# Patient Record
Sex: FEMALE | Race: WHITE | NOT HISPANIC OR LATINO
[De-identification: names, ages, dates, MRNs, and addresses within clinical notes are randomized per-mention and may not be internally consistent; named-entity substitution may affect disease eponyms.]

---

## 2017-10-25 PROBLEM — Z00.00 ENCOUNTER FOR PREVENTIVE HEALTH EXAMINATION: Status: ACTIVE | Noted: 2017-10-25

## 2017-11-15 ENCOUNTER — APPOINTMENT (OUTPATIENT)
Dept: OBGYN | Facility: CLINIC | Age: 32
End: 2017-11-15

## 2020-06-01 ENCOUNTER — ASOB RESULT (OUTPATIENT)
Age: 35
End: 2020-06-01

## 2020-06-01 ENCOUNTER — APPOINTMENT (OUTPATIENT)
Dept: ANTEPARTUM | Facility: CLINIC | Age: 35
End: 2020-06-01
Payer: MEDICAID

## 2020-06-01 PROCEDURE — 76811 OB US DETAILED SNGL FETUS: CPT

## 2020-07-05 ENCOUNTER — FORM ENCOUNTER (OUTPATIENT)
Age: 35
End: 2020-07-05

## 2020-08-05 ENCOUNTER — FORM ENCOUNTER (OUTPATIENT)
Age: 35
End: 2020-08-05

## 2020-09-03 ENCOUNTER — FORM ENCOUNTER (OUTPATIENT)
Age: 35
End: 2020-09-03

## 2020-09-22 ENCOUNTER — FORM ENCOUNTER (OUTPATIENT)
Age: 35
End: 2020-09-22

## 2020-09-30 ENCOUNTER — FORM ENCOUNTER (OUTPATIENT)
Age: 35
End: 2020-09-30

## 2020-10-07 ENCOUNTER — FORM ENCOUNTER (OUTPATIENT)
Age: 35
End: 2020-10-07

## 2020-10-15 ENCOUNTER — FORM ENCOUNTER (OUTPATIENT)
Age: 35
End: 2020-10-15

## 2020-10-18 ENCOUNTER — INPATIENT (INPATIENT)
Facility: HOSPITAL | Age: 35
LOS: 2 days | Discharge: HOME | End: 2020-10-21
Attending: OBSTETRICS & GYNECOLOGY | Admitting: OBSTETRICS & GYNECOLOGY
Payer: MEDICAID

## 2020-10-18 VITALS — SYSTOLIC BLOOD PRESSURE: 142 MMHG | HEART RATE: 106 BPM | DIASTOLIC BLOOD PRESSURE: 70 MMHG

## 2020-10-18 LAB
BASOPHILS # BLD AUTO: 0.04 K/UL — SIGNIFICANT CHANGE UP (ref 0–0.2)
BASOPHILS NFR BLD AUTO: 0.4 % — SIGNIFICANT CHANGE UP (ref 0–1)
BLD GP AB SCN SERPL QL: SIGNIFICANT CHANGE UP
EOSINOPHIL # BLD AUTO: 0.08 K/UL — SIGNIFICANT CHANGE UP (ref 0–0.7)
EOSINOPHIL NFR BLD AUTO: 0.8 % — SIGNIFICANT CHANGE UP (ref 0–8)
HCT VFR BLD CALC: 38.5 % — SIGNIFICANT CHANGE UP (ref 37–47)
HGB BLD-MCNC: 13 G/DL — SIGNIFICANT CHANGE UP (ref 12–16)
IMM GRANULOCYTES NFR BLD AUTO: 1 % — HIGH (ref 0.1–0.3)
LYMPHOCYTES # BLD AUTO: 1.52 K/UL — SIGNIFICANT CHANGE UP (ref 1.2–3.4)
LYMPHOCYTES # BLD AUTO: 14.6 % — LOW (ref 20.5–51.1)
MCHC RBC-ENTMCNC: 31 PG — SIGNIFICANT CHANGE UP (ref 27–31)
MCHC RBC-ENTMCNC: 33.8 G/DL — SIGNIFICANT CHANGE UP (ref 32–37)
MCV RBC AUTO: 91.9 FL — SIGNIFICANT CHANGE UP (ref 81–99)
MONOCYTES # BLD AUTO: 0.97 K/UL — HIGH (ref 0.1–0.6)
MONOCYTES NFR BLD AUTO: 9.3 % — SIGNIFICANT CHANGE UP (ref 1.7–9.3)
NEUTROPHILS # BLD AUTO: 7.67 K/UL — HIGH (ref 1.4–6.5)
NEUTROPHILS NFR BLD AUTO: 73.9 % — SIGNIFICANT CHANGE UP (ref 42.2–75.2)
NRBC # BLD: 0 /100 WBCS — SIGNIFICANT CHANGE UP (ref 0–0)
PLATELET # BLD AUTO: 204 K/UL — SIGNIFICANT CHANGE UP (ref 130–400)
PRENATAL SYPHILIS TEST: SIGNIFICANT CHANGE UP
RBC # BLD: 4.19 M/UL — LOW (ref 4.2–5.4)
RBC # FLD: 13.4 % — SIGNIFICANT CHANGE UP (ref 11.5–14.5)
SARS-COV-2 IGG SERPL QL IA: NEGATIVE — SIGNIFICANT CHANGE UP
SARS-COV-2 IGM SERPL IA-ACNC: 0.09 INDEX — SIGNIFICANT CHANGE UP
SARS-COV-2 RNA SPEC QL NAA+PROBE: SIGNIFICANT CHANGE UP
WBC # BLD: 10.38 K/UL — SIGNIFICANT CHANGE UP (ref 4.8–10.8)
WBC # FLD AUTO: 10.38 K/UL — SIGNIFICANT CHANGE UP (ref 4.8–10.8)

## 2020-10-18 PROCEDURE — 59400 OBSTETRICAL CARE: CPT | Mod: U9

## 2020-10-18 RX ORDER — OXYTOCIN 10 UNIT/ML
333.33 VIAL (ML) INJECTION
Qty: 20 | Refills: 0 | Status: DISCONTINUED | OUTPATIENT
Start: 2020-10-18 | End: 2020-10-18

## 2020-10-18 RX ORDER — LANOLIN
1 OINTMENT (GRAM) TOPICAL EVERY 6 HOURS
Refills: 0 | Status: DISCONTINUED | OUTPATIENT
Start: 2020-10-18 | End: 2020-10-21

## 2020-10-18 RX ORDER — SODIUM CHLORIDE 9 MG/ML
1000 INJECTION, SOLUTION INTRAVENOUS
Refills: 0 | Status: DISCONTINUED | OUTPATIENT
Start: 2020-10-18 | End: 2020-10-18

## 2020-10-18 RX ORDER — DIBUCAINE 1 %
1 OINTMENT (GRAM) RECTAL EVERY 6 HOURS
Refills: 0 | Status: DISCONTINUED | OUTPATIENT
Start: 2020-10-18 | End: 2020-10-21

## 2020-10-18 RX ORDER — OXYTOCIN 10 UNIT/ML
333.33 VIAL (ML) INJECTION
Qty: 20 | Refills: 0 | Status: DISCONTINUED | OUTPATIENT
Start: 2020-10-18 | End: 2020-10-21

## 2020-10-18 RX ORDER — DIPHENHYDRAMINE HCL 50 MG
25 CAPSULE ORAL EVERY 6 HOURS
Refills: 0 | Status: DISCONTINUED | OUTPATIENT
Start: 2020-10-18 | End: 2020-10-21

## 2020-10-18 RX ORDER — SODIUM CHLORIDE 9 MG/ML
3 INJECTION INTRAMUSCULAR; INTRAVENOUS; SUBCUTANEOUS EVERY 8 HOURS
Refills: 0 | Status: DISCONTINUED | OUTPATIENT
Start: 2020-10-18 | End: 2020-10-21

## 2020-10-18 RX ORDER — SIMETHICONE 80 MG/1
80 TABLET, CHEWABLE ORAL EVERY 4 HOURS
Refills: 0 | Status: DISCONTINUED | OUTPATIENT
Start: 2020-10-18 | End: 2020-10-21

## 2020-10-18 RX ORDER — MAGNESIUM HYDROXIDE 400 MG/1
30 TABLET, CHEWABLE ORAL
Refills: 0 | Status: DISCONTINUED | OUTPATIENT
Start: 2020-10-18 | End: 2020-10-21

## 2020-10-18 RX ORDER — OXYCODONE HYDROCHLORIDE 5 MG/1
5 TABLET ORAL ONCE
Refills: 0 | Status: DISCONTINUED | OUTPATIENT
Start: 2020-10-18 | End: 2020-10-21

## 2020-10-18 RX ORDER — OXYCODONE HYDROCHLORIDE 5 MG/1
5 TABLET ORAL
Refills: 0 | Status: DISCONTINUED | OUTPATIENT
Start: 2020-10-18 | End: 2020-10-21

## 2020-10-18 RX ORDER — IBUPROFEN 200 MG
600 TABLET ORAL EVERY 6 HOURS
Refills: 0 | Status: DISCONTINUED | OUTPATIENT
Start: 2020-10-18 | End: 2020-10-21

## 2020-10-18 RX ORDER — HYDROCORTISONE 1 %
1 OINTMENT (GRAM) TOPICAL EVERY 6 HOURS
Refills: 0 | Status: DISCONTINUED | OUTPATIENT
Start: 2020-10-18 | End: 2020-10-21

## 2020-10-18 RX ORDER — ACETAMINOPHEN 500 MG
975 TABLET ORAL
Refills: 0 | Status: DISCONTINUED | OUTPATIENT
Start: 2020-10-18 | End: 2020-10-21

## 2020-10-18 RX ORDER — PRAMOXINE HYDROCHLORIDE 150 MG/15G
1 AEROSOL, FOAM RECTAL EVERY 4 HOURS
Refills: 0 | Status: DISCONTINUED | OUTPATIENT
Start: 2020-10-18 | End: 2020-10-21

## 2020-10-18 RX ORDER — AER TRAVELER 0.5 G/1
1 SOLUTION RECTAL; TOPICAL EVERY 4 HOURS
Refills: 0 | Status: DISCONTINUED | OUTPATIENT
Start: 2020-10-18 | End: 2020-10-21

## 2020-10-18 RX ORDER — IBUPROFEN 200 MG
600 TABLET ORAL EVERY 6 HOURS
Refills: 0 | Status: COMPLETED | OUTPATIENT
Start: 2020-10-18 | End: 2021-09-16

## 2020-10-18 RX ORDER — KETOROLAC TROMETHAMINE 30 MG/ML
30 SYRINGE (ML) INJECTION EVERY 6 HOURS
Refills: 0 | Status: DISCONTINUED | OUTPATIENT
Start: 2020-10-18 | End: 2020-10-18

## 2020-10-18 RX ORDER — TETANUS TOXOID, REDUCED DIPHTHERIA TOXOID AND ACELLULAR PERTUSSIS VACCINE, ADSORBED 5; 2.5; 8; 8; 2.5 [IU]/.5ML; [IU]/.5ML; UG/.5ML; UG/.5ML; UG/.5ML
0.5 SUSPENSION INTRAMUSCULAR ONCE
Refills: 0 | Status: DISCONTINUED | OUTPATIENT
Start: 2020-10-18 | End: 2020-10-19

## 2020-10-18 RX ORDER — BENZOCAINE 10 %
1 GEL (GRAM) MUCOUS MEMBRANE EVERY 6 HOURS
Refills: 0 | Status: DISCONTINUED | OUTPATIENT
Start: 2020-10-18 | End: 2020-10-21

## 2020-10-18 RX ADMIN — Medication 1 APPLICATION(S): at 18:05

## 2020-10-18 RX ADMIN — Medication 600 MILLIGRAM(S): at 23:15

## 2020-10-18 RX ADMIN — Medication 600 MILLIGRAM(S): at 18:05

## 2020-10-18 RX ADMIN — Medication 1 SPRAY(S): at 18:05

## 2020-10-18 RX ADMIN — Medication 30 MILLIGRAM(S): at 16:20

## 2020-10-18 RX ADMIN — Medication 600 MILLIGRAM(S): at 18:36

## 2020-10-18 RX ADMIN — Medication 30 MILLIGRAM(S): at 13:30

## 2020-10-18 RX ADMIN — SODIUM CHLORIDE 3 MILLILITER(S): 9 INJECTION INTRAMUSCULAR; INTRAVENOUS; SUBCUTANEOUS at 20:46

## 2020-10-18 NOTE — OB PROVIDER DELIVERY SUMMARY - NSPROVIDERDELIVERYNOTE_OBGYN_ALL_OB_FT
Patient was fully dilated and pushed. NSD live male, Apgars 9/9. Vtx delivered OA  ,  Fetal head restituted to ROT. The anterior shoulder was the babies left and this was noted to be locked behind the symphysis , a minimal amout of time elapsed , and with Melvin and suprapubic pressure the anterior shoulder delivered by the posterior shoulders, followed by the remaining body atraumatically. Delayed cord clamping was performed, and then clamped and cut. Cord blood & gases collected. The  was handed pediatrics and then  to mother for skin to skin. The placenta delivered spontaneously intact with 3v cord. Uterus massaged and firm with oxytocin given IV. Cervix, vagina and perineum inspected. Repair of a small first degree perineal laceration  , in the usual fashion with 2-0, 3-0 chromic.   EBL -400cc, hemostasis assured.

## 2020-10-18 NOTE — OB RN PATIENT PROFILE - NS_MEANSOFARRIVAL_OBGYN_ALL_OB
Seen at bedside, complaining of weakness, unable to ambulate secondary to pain. Has urinary retention likely secondary to combination of decreased ambulation, pain, narcotics and constipation. Would reccomend repeat trial of void when pt has improvement in ambulation, having BMs and minimal narcotic requirement. Would perform TOV on day of D/C, Ambulatory

## 2020-10-18 NOTE — OB PROVIDER H&P - NS_OBGYNHISTORY_OBGYN_ALL_OB_FT
NSVDX6, largest baby 9-5  Denies history of abnormal papsmears, STIs, uterine fibroids or ovarian cysts

## 2020-10-18 NOTE — OB PROVIDER H&P - ASSESSMENT
34yo  at 40w2d, GBS negative, in labor.   -Admit to L+D  -Monitor EFM and TOCO   -IVF and labs  -Pain control PRN  -Clear liquid diet as tolerated    Dr. Mobley aware

## 2020-10-18 NOTE — OB PROVIDER H&P - NSHPLABSRESULTS_GEN_ALL_CORE
9/23/20  GBS negative   HIV negative 9/23/20  GBS negative   HIV negative      gct 78    6/1/20: AGA 85%, afv nl anterior left lateral palcenta, no previa, normal anatomy     Anti-Kaci ab 1:1, anti- jka too weak to titrate

## 2020-10-18 NOTE — OB PROVIDER H&P - ATTENDING COMMENTS
36yo  at 40w2d, GBS negative, in active labor. Efw 3800gm, adequate pelvis, cvx fully dilated  -admit  -iv access  -anticipate nsd

## 2020-10-18 NOTE — OB PROVIDER H&P - NSHPPHYSICALEXAM_GEN_ALL_CORE
T(C): 37.2 (10-18-20 @ 12:32), Max: 37.2 (10-18-20 @ 12:32)  HR: 80 (10-18-20 @ 12:53) (80 - 106)  BP: 109/68 (10-18-20 @ 12:53) (91/53 - 142/70)  RR: 22 (10-18-20 @ 12:32) (22 - 22)  BMI (kg/m2): 30.2 (10-18-20 @ 12:32)    Gen: A+OX3. NAD  Abd: Soft, Nontender. Gravid.  FHR: 150bpm mod glynn accels+  TOCO: occasional   SVE: fully dilated

## 2020-10-18 NOTE — OB PROVIDER H&P - HISTORY OF PRESENT ILLNESS
36yo  at 40w2d, GBS negative, with contractions since 0400. Denies LOF, VB and reports good FM. reports ctx 10/10 every 1-2 min. Pregnancy complicated by LGA, no GDM. Complicated by Anti Kaci and Anti JKA abs.

## 2020-10-19 LAB
BASOPHILS # BLD AUTO: 0.06 K/UL — SIGNIFICANT CHANGE UP (ref 0–0.2)
BASOPHILS NFR BLD AUTO: 0.6 % — SIGNIFICANT CHANGE UP (ref 0–1)
EOSINOPHIL # BLD AUTO: 0.13 K/UL — SIGNIFICANT CHANGE UP (ref 0–0.7)
EOSINOPHIL NFR BLD AUTO: 1.2 % — SIGNIFICANT CHANGE UP (ref 0–8)
FETAL SCREEN: SIGNIFICANT CHANGE UP
HCT VFR BLD CALC: 36.7 % — LOW (ref 37–47)
HGB BLD-MCNC: 12.2 G/DL — SIGNIFICANT CHANGE UP (ref 12–16)
IMM GRANULOCYTES NFR BLD AUTO: 0.9 % — HIGH (ref 0.1–0.3)
LYMPHOCYTES # BLD AUTO: 1.96 K/UL — SIGNIFICANT CHANGE UP (ref 1.2–3.4)
LYMPHOCYTES # BLD AUTO: 18.5 % — LOW (ref 20.5–51.1)
MCHC RBC-ENTMCNC: 31.2 PG — HIGH (ref 27–31)
MCHC RBC-ENTMCNC: 33.2 G/DL — SIGNIFICANT CHANGE UP (ref 32–37)
MCV RBC AUTO: 93.9 FL — SIGNIFICANT CHANGE UP (ref 81–99)
MONOCYTES # BLD AUTO: 1.08 K/UL — HIGH (ref 0.1–0.6)
MONOCYTES NFR BLD AUTO: 10.2 % — HIGH (ref 1.7–9.3)
NEUTROPHILS # BLD AUTO: 7.29 K/UL — HIGH (ref 1.4–6.5)
NEUTROPHILS NFR BLD AUTO: 68.6 % — SIGNIFICANT CHANGE UP (ref 42.2–75.2)
NRBC # BLD: 0 /100 WBCS — SIGNIFICANT CHANGE UP (ref 0–0)
PLATELET # BLD AUTO: 205 K/UL — SIGNIFICANT CHANGE UP (ref 130–400)
RBC # BLD: 3.91 M/UL — LOW (ref 4.2–5.4)
RBC # FLD: 13.6 % — SIGNIFICANT CHANGE UP (ref 11.5–14.5)
WBC # BLD: 10.62 K/UL — SIGNIFICANT CHANGE UP (ref 4.8–10.8)
WBC # FLD AUTO: 10.62 K/UL — SIGNIFICANT CHANGE UP (ref 4.8–10.8)

## 2020-10-19 RX ADMIN — SODIUM CHLORIDE 3 MILLILITER(S): 9 INJECTION INTRAMUSCULAR; INTRAVENOUS; SUBCUTANEOUS at 22:06

## 2020-10-19 RX ADMIN — Medication 600 MILLIGRAM(S): at 05:58

## 2020-10-19 RX ADMIN — Medication 600 MILLIGRAM(S): at 18:32

## 2020-10-19 RX ADMIN — SODIUM CHLORIDE 3 MILLILITER(S): 9 INJECTION INTRAMUSCULAR; INTRAVENOUS; SUBCUTANEOUS at 06:20

## 2020-10-19 RX ADMIN — Medication 600 MILLIGRAM(S): at 00:00

## 2020-10-19 RX ADMIN — Medication 600 MILLIGRAM(S): at 12:46

## 2020-10-19 RX ADMIN — Medication 1 TABLET(S): at 12:03

## 2020-10-19 RX ADMIN — Medication 600 MILLIGRAM(S): at 06:53

## 2020-10-19 RX ADMIN — Medication 600 MILLIGRAM(S): at 12:03

## 2020-10-19 RX ADMIN — SODIUM CHLORIDE 3 MILLILITER(S): 9 INJECTION INTRAMUSCULAR; INTRAVENOUS; SUBCUTANEOUS at 13:44

## 2020-10-19 RX ADMIN — Medication 600 MILLIGRAM(S): at 17:16

## 2020-10-19 NOTE — PROGRESS NOTE ADULT - SUBJECTIVE AND OBJECTIVE BOX
OB attending  PPD #1    Pt doing well, pain well controlled. No overnight events, no acute complaints.    Ambulating: Yes  Voiding: Yes  Flatus: Yes  Bowel movements: Yes   Breast or bottle feeding: Breastfeeding  Diet: Regular    PAST MEDICAL & SURGICAL HISTORY:  No pertinent past medical history    No significant past surgical history        Physical Exam  Vital Signs Last 24 Hrs  T(C): 36.1 (19 Oct 2020 07:40), Max: 37.2 (18 Oct 2020 12:32)  T(F): 97 (19 Oct 2020 07:40), Max: 99 (18 Oct 2020 12:32)  HR: 73 (19 Oct 2020 07:40) (58 - 106)  BP: 93/60 (19 Oct 2020 07:40) (91/53 - 142/70)  RR: 18 (19 Oct 2020 07:40) (18 - 22)  Gen: AAOx3, NAD  Abd: Soft, nontender, nondistended, BS+  Fundus: Firm, below umbilicus  Lochia: normal  Ext: No calf tenderness, no swelling    Labs:                        13.0   10.38 )-----------( 204      ( 18 Oct 2020 12:57 )             38.5     mother Rh neg  baby rh +     A/P: s/p , PPD #1, doing well, needs Rhogam   -Rhogam ordered  - continue current management  -d/c in Am

## 2020-10-20 ENCOUNTER — TRANSCRIPTION ENCOUNTER (OUTPATIENT)
Age: 35
End: 2020-10-20

## 2020-10-20 VITALS
TEMPERATURE: 99 F | DIASTOLIC BLOOD PRESSURE: 58 MMHG | HEART RATE: 58 BPM | RESPIRATION RATE: 18 BRPM | SYSTOLIC BLOOD PRESSURE: 95 MMHG

## 2020-10-20 RX ORDER — IBUPROFEN 200 MG
1 TABLET ORAL
Qty: 0 | Refills: 0 | DISCHARGE
Start: 2020-10-20

## 2020-10-20 RX ADMIN — Medication 600 MILLIGRAM(S): at 00:30

## 2020-10-20 RX ADMIN — Medication 600 MILLIGRAM(S): at 01:00

## 2020-10-20 RX ADMIN — SODIUM CHLORIDE 3 MILLILITER(S): 9 INJECTION INTRAMUSCULAR; INTRAVENOUS; SUBCUTANEOUS at 15:01

## 2020-10-20 RX ADMIN — Medication 1 APPLICATION(S): at 17:08

## 2020-10-20 RX ADMIN — Medication 600 MILLIGRAM(S): at 06:26

## 2020-10-20 RX ADMIN — Medication 1 TABLET(S): at 11:22

## 2020-10-20 RX ADMIN — Medication 1 SPRAY(S): at 17:08

## 2020-10-20 RX ADMIN — Medication 600 MILLIGRAM(S): at 17:06

## 2020-10-20 RX ADMIN — Medication 600 MILLIGRAM(S): at 11:22

## 2020-10-20 RX ADMIN — Medication 600 MILLIGRAM(S): at 13:16

## 2020-10-20 RX ADMIN — SODIUM CHLORIDE 3 MILLILITER(S): 9 INJECTION INTRAMUSCULAR; INTRAVENOUS; SUBCUTANEOUS at 06:28

## 2020-10-20 NOTE — PROGRESS NOTE ADULT - SUBJECTIVE AND OBJECTIVE BOX
Subjective:   Patient doing well. No complaints. Minimal lochia. Pain controlled.    Objective:   T(F): 98 (10-20 @ 07:35), Max: 98 (10-20 @ 07:35)  HR: 53 (10-20 @ 07:35)  BP: 115/65 (10-20 @ 07:35) (105/59 - 115/65)  RR: 18 (10-20 @ 07:35)  SpO2: --  Gen: AAOx3, NAD  Abd: Soft, Nontender, Nondistended, Fundus firm below the umbilicus  Ext: no tender, mild edema  Min Lochia Rubra    Labs:                        12.2   10.62 )-----------( 205      ( 19 Oct 2020 08:31 )             36.7             Tolerating regular diet  Passed flatus, passed bowel movement  Breast/Bottle feeding    Assessment:   35y s/p , PPD#2, doing well    Plan:  -Routine postpartum care  -Encouraged ambulation and PO hydration  -Tolerating regular diet

## 2020-10-20 NOTE — DISCHARGE NOTE OB - MEDICATION SUMMARY - MEDICATIONS TO TAKE
I will START or STAY ON the medications listed below when I get home from the hospital:    ibuprofen 600 mg oral tablet  -- 1 tab(s) by mouth every 6 hours  -- Indication: For pain

## 2020-10-20 NOTE — DISCHARGE NOTE OB - MEDICATION SUMMARY - MEDICATIONS TO STOP TAKING
I will STOP taking the medications listed below when I get home from the hospital:    ibuprofen 600 mg oral tablet  -- 1 tab(s) by mouth every 6 hours

## 2020-10-20 NOTE — DISCHARGE NOTE OB - HOSPITAL COURSE
DATE OF DISCHARGE: 10-20-20 @ 16:18    HISTORY OF PRESENT ILLNESS/HOSPITAL COURSE: HPI:  34yo  at 40w2d, GBS negative, with contractions since 0400. Denies LOF, VB and reports good FM. reports ctx 10/10 every 1-2 min. Pregnancy complicated by LGA, no GDM. Complicated by Anti [place] and Anti JKA abs.  (18 Oct 2020 13:00)    PAST MEDICAL & SURGICAL HISTORY:  No pertinent past medical history    No significant past surgical history        PROCEDURES PERFORMED: vaginal delivery    COMPLICATIONS:  none    POST PARTUM COURSE: uncomplicated     FINAL DIAGNOSIS:  normal vaginal delivery    LABS:                       12.2   10.62 )-----------( 205      ( 19 Oct 2020 08:31 )             36.7

## 2020-10-20 NOTE — DISCHARGE NOTE OB - CARE PLAN
Principal Discharge DX:	Vaginal delivery  Goal:	healthy recovery  Assessment and plan of treatment:	Nothing per vagina x 6 weeks; no sex/tampons/swimming/baths. Showers are OKAY.

## 2020-10-20 NOTE — DISCHARGE NOTE OB - CARE PROVIDER_API CALL
Yuriy Mobley  OBSTETRICS AND GYNECOLOGY  [address] [id number]  Phone: [phone]  Fax: [phone]  Follow Up Time:

## 2020-10-20 NOTE — DISCHARGE NOTE OB - PATIENT PORTAL LINK FT
You can access the FollowMyHealth Patient Portal offered by [facility] by registering at the following website: [email or web address]. By joining [employer]’s FollowMyHealth portal, you will also be able to view your health information using other applications (apps) compatible with our system.

## 2020-10-23 DIAGNOSIS — O36.63X0 MATERNAL CARE FOR EXCESSIVE FETAL GROWTH, THIRD TRIMESTER, NOT APPLICABLE OR UNSPECIFIED: ICD-10-CM

## 2020-10-23 DIAGNOSIS — O48.0 POST-TERM PREGNANCY: ICD-10-CM

## 2020-10-23 DIAGNOSIS — Z28.21 IMMUNIZATION NOT CARRIED OUT BECAUSE OF PATIENT REFUSAL: ICD-10-CM

## 2020-10-23 DIAGNOSIS — O36.0130 MATERNAL CARE FOR ANTI-D [RH] ANTIBODIES, THIRD TRIMESTER, NOT APPLICABLE OR UNSPECIFIED: ICD-10-CM

## 2020-10-23 DIAGNOSIS — Z28.09 IMMUNIZATION NOT CARRIED OUT BECAUSE OF OTHER CONTRAINDICATION: ICD-10-CM

## 2020-10-23 DIAGNOSIS — Z3A.40 40 WEEKS GESTATION OF PREGNANCY: ICD-10-CM

## 2020-12-02 ENCOUNTER — FORM ENCOUNTER (OUTPATIENT)
Age: 35
End: 2020-12-02

## 2020-12-03 VITALS — SYSTOLIC BLOOD PRESSURE: 109 MMHG | DIASTOLIC BLOOD PRESSURE: 74 MMHG | WEIGHT: 293 LBS

## 2022-08-02 PROBLEM — Z78.9 OTHER SPECIFIED HEALTH STATUS: Chronic | Status: ACTIVE | Noted: 2020-10-18

## 2022-08-04 ENCOUNTER — NON-APPOINTMENT (OUTPATIENT)
Age: 37
End: 2022-08-04

## 2022-08-04 DIAGNOSIS — Z78.9 OTHER SPECIFIED HEALTH STATUS: ICD-10-CM

## 2022-08-04 DIAGNOSIS — Z87.448 PERSONAL HISTORY OF OTHER DISEASES OF URINARY SYSTEM: ICD-10-CM

## 2022-08-04 DIAGNOSIS — Z98.890 OTHER SPECIFIED POSTPROCEDURAL STATES: ICD-10-CM

## 2022-08-04 DIAGNOSIS — Z87.59 PERSONAL HISTORY OF OTHER COMPLICATIONS OF PREGNANCY, CHILDBIRTH AND THE PUERPERIUM: ICD-10-CM

## 2022-08-04 DIAGNOSIS — N39.3 STRESS INCONTINENCE (FEMALE) (MALE): ICD-10-CM

## 2022-08-25 ENCOUNTER — APPOINTMENT (OUTPATIENT)
Dept: OBGYN | Facility: CLINIC | Age: 37
End: 2022-08-25

## 2022-08-25 VITALS
SYSTOLIC BLOOD PRESSURE: 112 MMHG | HEIGHT: 67 IN | BODY MASS INDEX: 25.74 KG/M2 | DIASTOLIC BLOOD PRESSURE: 73 MMHG | WEIGHT: 164 LBS

## 2022-08-25 DIAGNOSIS — Z01.419 ENCOUNTER FOR GYNECOLOGICAL EXAMINATION (GENERAL) (ROUTINE) W/OUT ABNORMAL FINDINGS: ICD-10-CM

## 2022-08-25 LAB
BILIRUB UR QL STRIP: NORMAL
GLUCOSE UR-MCNC: NORMAL
HCG UR QL: 0.2 EU/DL
HCG UR QL: POSITIVE
HGB UR QL STRIP.AUTO: NORMAL
KETONES UR-MCNC: NORMAL
LEUKOCYTE ESTERASE UR QL STRIP: NORMAL
NITRITE UR QL STRIP: NORMAL
PH UR STRIP: 8
PROT UR STRIP-MCNC: NORMAL
QUALITY CONTROL: YES
SP GR UR STRIP: 1.02

## 2022-08-25 PROCEDURE — 81003 URINALYSIS AUTO W/O SCOPE: CPT | Mod: QW

## 2022-08-25 PROCEDURE — 99395 PREV VISIT EST AGE 18-39: CPT

## 2022-08-25 PROCEDURE — 99212 OFFICE O/P EST SF 10 MIN: CPT | Mod: TH,25

## 2022-08-25 PROCEDURE — 76817 TRANSVAGINAL US OBSTETRIC: CPT

## 2022-08-25 PROCEDURE — 81025 URINE PREGNANCY TEST: CPT

## 2022-08-25 RX ORDER — SENNOSIDES 8.6 MG/1
8.6 CAPSULE, GELATIN COATED ORAL TWICE DAILY
Qty: 60 | Refills: 0 | Status: ACTIVE | COMMUNITY
Start: 2022-08-25 | End: 1900-01-01

## 2022-08-25 NOTE — HISTORY OF PRESENT ILLNESS
[Patient reported PAP Smear was normal] : Patient reported PAP Smear was normal [FreeTextEntry1] : Pt is , here for Pt is here for her annual exam and confirmation of her pregnancy. No abnormal bleeding or pain.\par LNMP was 6/4/22, bled on 7/16, but stopped none now. \par \par Ob/Gyn Hx- Nsdx 7, largest 10lbs, No gdm, no shoulder,after  Baby 6, had sab  transfusion developed MIRELLA Clemons , no issues last pregnancy, pt also Rh neg,  is rh +, pap 2020 , No hx majneet/hpv \par \par Med/Surg Hx -n/c \par  \par Family Hx n/c \par \par  [PapSmeardate] : 04/20

## 2022-08-25 NOTE — PHYSICAL EXAM
[Chaperone Present] : A chaperone was present in the examining room during all aspects of the physical examination [FreeTextEntry1] : Aline [Appropriately responsive] : appropriately responsive [Alert] : alert [No Acute Distress] : no acute distress [No Lymphadenopathy] : no lymphadenopathy [Regular Rate Rhythm] : regular rate rhythm [No Murmurs] : no murmurs [Clear to Auscultation B/L] : clear to auscultation bilaterally [Soft] : soft [Non-tender] : non-tender [Non-distended] : non-distended [No HSM] : No HSM [No Lesions] : no lesions [No Mass] : no mass [Oriented x3] : oriented x3 [Examination Of The Breasts] : a normal appearance [No Masses] : no breast masses were palpable [Labia Majora] : normal [Labia Minora] : normal [Normal] : normal [Enlarged ___ wks] : enlarged [unfilled] ~Uweeks [Uterine Adnexae] : normal

## 2022-08-26 LAB
ABO + RH PNL BLD: NORMAL
BASOPHILS # BLD AUTO: 0.04 K/UL
BASOPHILS NFR BLD AUTO: 0.4 %
BLD GP AB SCN SERPL QL: NORMAL
C TRACH RRNA SPEC QL NAA+PROBE: NOT DETECTED
EOSINOPHIL # BLD AUTO: 0.2 K/UL
EOSINOPHIL NFR BLD AUTO: 2.2 %
HBV SURFACE AG SER QL: NONREACTIVE
HCT VFR BLD CALC: 37.1 %
HCV AB SER QL: NONREACTIVE
HCV S/CO RATIO: 0.15 S/CO
HGB BLD-MCNC: 11.9 G/DL
HIV1+2 AB SPEC QL IA.RAPID: NONREACTIVE
IMM GRANULOCYTES NFR BLD AUTO: 0.4 %
LYMPHOCYTES # BLD AUTO: 1.66 K/UL
LYMPHOCYTES NFR BLD AUTO: 18.2 %
MAN DIFF?: NORMAL
MCHC RBC-ENTMCNC: 31.9 PG
MCHC RBC-ENTMCNC: 32.1 GM/DL
MCV RBC AUTO: 99.5 FL
MEV IGG FLD QL IA: 23.2 AU/ML
MEV IGG+IGM SER-IMP: POSITIVE
MONOCYTES # BLD AUTO: 0.64 K/UL
MONOCYTES NFR BLD AUTO: 7 %
MUV AB SER-ACNC: POSITIVE
MUV IGG SER QL IA: 45.6 AU/ML
N GONORRHOEA RRNA SPEC QL NAA+PROBE: NOT DETECTED
NEUTROPHILS # BLD AUTO: 6.54 K/UL
NEUTROPHILS NFR BLD AUTO: 71.8 %
PLATELET # BLD AUTO: 236 K/UL
RBC # BLD: 3.73 M/UL
RBC # FLD: 13.8 %
RUBV IGG FLD-ACNC: 4 INDEX
RUBV IGG SER-IMP: POSITIVE
SOURCE AMPLIFICATION: NORMAL
T PALLIDUM AB SER QL IA: NEGATIVE
VZV AB TITR SER: POSITIVE
VZV IGG SER IF-ACNC: 815.6 INDEX
WBC # FLD AUTO: 9.12 K/UL

## 2022-08-28 LAB
BACTERIA UR CULT: NORMAL
LEAD BLD-MCNC: <1 UG/DL

## 2022-09-02 LAB
B19V IGG SER QL IA: 4.9 INDEX
B19V IGG+IGM SER-IMP: NORMAL
B19V IGG+IGM SER-IMP: POSITIVE
B19V IGM FLD-ACNC: 0.11 INDEX
B19V IGM SER-ACNC: NEGATIVE

## 2022-09-22 ENCOUNTER — APPOINTMENT (OUTPATIENT)
Dept: OBGYN | Facility: CLINIC | Age: 37
End: 2022-09-22

## 2022-09-22 LAB
BILIRUB UR QL STRIP: NORMAL
CLARITY UR: CLEAR
COLLECTION METHOD: NORMAL
GLUCOSE UR-MCNC: NORMAL
HCG UR QL: 0.2 EU/DL
HGB UR QL STRIP.AUTO: NORMAL
KETONES UR-MCNC: NORMAL
LEUKOCYTE ESTERASE UR QL STRIP: NORMAL
NITRITE UR QL STRIP: NORMAL
PH UR STRIP: 5.5
PROT UR STRIP-MCNC: NORMAL
SP GR UR STRIP: 1.03

## 2022-09-22 PROCEDURE — 81003 URINALYSIS AUTO W/O SCOPE: CPT | Mod: QW

## 2022-09-22 PROCEDURE — 99213 OFFICE O/P EST LOW 20 MIN: CPT | Mod: TH

## 2022-09-23 ENCOUNTER — NON-APPOINTMENT (OUTPATIENT)
Age: 37
End: 2022-09-23

## 2022-09-25 ENCOUNTER — NON-APPOINTMENT (OUTPATIENT)
Age: 37
End: 2022-09-25

## 2022-10-28 LAB — BLD GP AB SCN SERPL QL: NORMAL

## 2022-11-09 ENCOUNTER — APPOINTMENT (OUTPATIENT)
Dept: ANTEPARTUM | Facility: CLINIC | Age: 37
End: 2022-11-09

## 2022-11-09 ENCOUNTER — APPOINTMENT (OUTPATIENT)
Dept: OBGYN | Facility: CLINIC | Age: 37
End: 2022-11-09

## 2022-11-09 ENCOUNTER — ASOB RESULT (OUTPATIENT)
Age: 37
End: 2022-11-09

## 2022-11-09 VITALS
SYSTOLIC BLOOD PRESSURE: 91 MMHG | DIASTOLIC BLOOD PRESSURE: 55 MMHG | WEIGHT: 170 LBS | HEIGHT: 66 IN | BODY MASS INDEX: 27.32 KG/M2

## 2022-11-09 DIAGNOSIS — R76.8 OTHER SPECIFIED ABNORMAL IMMUNOLOGICAL FINDINGS IN SERUM: ICD-10-CM

## 2022-11-09 DIAGNOSIS — O36.1990 MATERNAL CARE FOR OTHER ISOIMMUNIZATION, UNSPECIFIED TRIMESTER, NOT APPLICABLE OR UNSPECIFIED: ICD-10-CM

## 2022-11-09 PROCEDURE — 81003 URINALYSIS AUTO W/O SCOPE: CPT | Mod: QW

## 2022-11-09 PROCEDURE — 99213 OFFICE O/P EST LOW 20 MIN: CPT | Mod: TH

## 2022-11-09 PROCEDURE — 76811 OB US DETAILED SNGL FETUS: CPT

## 2022-11-10 PROBLEM — R76.8 JKA ANTIBODY POSITIVE: Status: ACTIVE | Noted: 2022-08-25

## 2022-11-10 PROBLEM — O36.1990 KELL ISOIMMUNIZATION IN PREGNANCY: Status: ACTIVE | Noted: 2022-08-25

## 2022-11-10 LAB
BILIRUB UR QL STRIP: NORMAL
BLD GP AB SCN SERPL QL: NORMAL
GLUCOSE UR-MCNC: NORMAL
HCG UR QL: 1 EU/DL
HGB UR QL STRIP.AUTO: NORMAL
KETONES UR-MCNC: NORMAL
LEUKOCYTE ESTERASE UR QL STRIP: NORMAL
NITRITE UR QL STRIP: NORMAL
PH UR STRIP: 7
PROT UR STRIP-MCNC: 30
SP GR UR STRIP: 1.02

## 2022-11-20 ENCOUNTER — NON-APPOINTMENT (OUTPATIENT)
Age: 37
End: 2022-11-20

## 2022-12-12 ENCOUNTER — APPOINTMENT (OUTPATIENT)
Dept: OBGYN | Facility: CLINIC | Age: 37
End: 2022-12-12

## 2022-12-12 VITALS — DIASTOLIC BLOOD PRESSURE: 68 MMHG | WEIGHT: 175 LBS | SYSTOLIC BLOOD PRESSURE: 104 MMHG | BODY MASS INDEX: 28.25 KG/M2

## 2022-12-12 LAB
BILIRUB UR QL STRIP: NORMAL
GLUCOSE UR-MCNC: NORMAL
HCG UR QL: 0.2 EU/DL
HGB UR QL STRIP.AUTO: NORMAL
KETONES UR-MCNC: NORMAL
LEUKOCYTE ESTERASE UR QL STRIP: NORMAL
NITRITE UR QL STRIP: NORMAL
PH UR STRIP: 5.5
PROT UR STRIP-MCNC: NORMAL
SP GR UR STRIP: 1

## 2022-12-12 PROCEDURE — 99213 OFFICE O/P EST LOW 20 MIN: CPT | Mod: TH,25

## 2022-12-12 PROCEDURE — 81003 URINALYSIS AUTO W/O SCOPE: CPT | Mod: QW

## 2022-12-12 PROCEDURE — 96372 THER/PROPH/DIAG INJ SC/IM: CPT

## 2022-12-12 RX ADMIN — HUMAN RHO(D) IMMUNE GLOBULIN 0 UNIT: 300 INJECTION, SOLUTION INTRAMUSCULAR at 00:00

## 2022-12-13 DIAGNOSIS — D64.9 ANEMIA, UNSPECIFIED: ICD-10-CM

## 2022-12-13 LAB
BASOPHILS # BLD AUTO: 0.04 K/UL
BASOPHILS NFR BLD AUTO: 0.5 %
BLD GP AB SCN SERPL QL: NORMAL
EOSINOPHIL # BLD AUTO: 0.23 K/UL
EOSINOPHIL NFR BLD AUTO: 2.8 %
GLUCOSE 1H P 50 G GLC PO SERPL-MCNC: 121 MG/DL
HCT VFR BLD CALC: 30.5 %
HGB BLD-MCNC: 9.7 G/DL
IMM GRANULOCYTES NFR BLD AUTO: 0.9 %
LYMPHOCYTES # BLD AUTO: 1.47 K/UL
LYMPHOCYTES NFR BLD AUTO: 18.2 %
MAN DIFF?: NORMAL
MCHC RBC-ENTMCNC: 31.8 GM/DL
MCHC RBC-ENTMCNC: 31.8 PG
MCV RBC AUTO: 100 FL
MONOCYTES # BLD AUTO: 0.72 K/UL
MONOCYTES NFR BLD AUTO: 8.9 %
NEUTROPHILS # BLD AUTO: 5.56 K/UL
NEUTROPHILS NFR BLD AUTO: 68.7 %
PLATELET # BLD AUTO: 197 K/UL
RBC # BLD: 3.05 M/UL
RBC # FLD: 13.2 %
WBC # FLD AUTO: 8.09 K/UL

## 2022-12-13 RX ORDER — CHLORHEXIDINE GLUCONATE 4 %
325 (65 FE) LIQUID (ML) TOPICAL DAILY
Qty: 30 | Refills: 2 | Status: ACTIVE | COMMUNITY
Start: 2022-12-13 | End: 1900-01-01

## 2023-01-11 ENCOUNTER — APPOINTMENT (OUTPATIENT)
Dept: OBGYN | Facility: CLINIC | Age: 38
End: 2023-01-11
Payer: MEDICAID

## 2023-01-11 VITALS — BODY MASS INDEX: 28.57 KG/M2 | WEIGHT: 177 LBS | SYSTOLIC BLOOD PRESSURE: 104 MMHG | DIASTOLIC BLOOD PRESSURE: 70 MMHG

## 2023-01-11 LAB
BILIRUB UR QL STRIP: NORMAL
GLUCOSE UR-MCNC: NORMAL
HCG UR QL: 1 EU/DL
HGB UR QL STRIP.AUTO: NORMAL
KETONES UR-MCNC: NORMAL
LEUKOCYTE ESTERASE UR QL STRIP: NORMAL
NITRITE UR QL STRIP: NORMAL
PH UR STRIP: 6.5
PROT UR STRIP-MCNC: NORMAL
SP GR UR STRIP: 1.02

## 2023-01-11 PROCEDURE — 81003 URINALYSIS AUTO W/O SCOPE: CPT | Mod: QW

## 2023-01-11 PROCEDURE — 99213 OFFICE O/P EST LOW 20 MIN: CPT | Mod: TH

## 2023-02-16 ENCOUNTER — APPOINTMENT (OUTPATIENT)
Dept: OBGYN | Facility: CLINIC | Age: 38
End: 2023-02-16
Payer: MEDICAID

## 2023-02-16 ENCOUNTER — LABORATORY RESULT (OUTPATIENT)
Age: 38
End: 2023-02-16

## 2023-02-16 VITALS — DIASTOLIC BLOOD PRESSURE: 70 MMHG | BODY MASS INDEX: 29.05 KG/M2 | SYSTOLIC BLOOD PRESSURE: 106 MMHG | WEIGHT: 180 LBS

## 2023-02-16 LAB
BILIRUB UR QL STRIP: NORMAL
GLUCOSE UR-MCNC: NORMAL
HCG UR QL: 1 EU/DL
HGB UR QL STRIP.AUTO: NORMAL
KETONES UR-MCNC: NORMAL
LEUKOCYTE ESTERASE UR QL STRIP: NORMAL
NITRITE UR QL STRIP: NORMAL
PH UR STRIP: 5.5
PROT UR STRIP-MCNC: 30
SP GR UR STRIP: 1.03

## 2023-02-16 PROCEDURE — 99213 OFFICE O/P EST LOW 20 MIN: CPT | Mod: TH,25

## 2023-02-16 PROCEDURE — 76816 OB US FOLLOW-UP PER FETUS: CPT

## 2023-02-16 PROCEDURE — 81003 URINALYSIS AUTO W/O SCOPE: CPT | Mod: QW

## 2023-02-17 ENCOUNTER — NON-APPOINTMENT (OUTPATIENT)
Age: 38
End: 2023-02-17

## 2023-02-17 LAB
BASOPHILS # BLD AUTO: 0.06 K/UL
BASOPHILS NFR BLD AUTO: 0.6 %
EOSINOPHIL # BLD AUTO: 0.18 K/UL
EOSINOPHIL NFR BLD AUTO: 1.9 %
HCT VFR BLD CALC: 33.9 %
HCV AB SER QL: NONREACTIVE
HCV S/CO RATIO: 0.09 S/CO
HGB BLD-MCNC: 11.4 G/DL
HIV1+2 AB SPEC QL IA.RAPID: NONREACTIVE
IMM GRANULOCYTES NFR BLD AUTO: 1.9 %
LYMPHOCYTES # BLD AUTO: 1.61 K/UL
LYMPHOCYTES NFR BLD AUTO: 16.9 %
MAN DIFF?: NORMAL
MCHC RBC-ENTMCNC: 33.2 PG
MCHC RBC-ENTMCNC: 33.6 GM/DL
MCV RBC AUTO: 98.8 FL
MONOCYTES # BLD AUTO: 0.9 K/UL
MONOCYTES NFR BLD AUTO: 9.4 %
NEUTROPHILS # BLD AUTO: 6.6 K/UL
NEUTROPHILS NFR BLD AUTO: 69.3 %
PLATELET # BLD AUTO: 156 K/UL
RBC # BLD: 3.43 M/UL
RBC # FLD: 14.1 %
WBC # FLD AUTO: 9.53 K/UL

## 2023-02-21 LAB
B-HEM STREP SPEC QL CULT: NORMAL
RPR SER-TITR: NORMAL

## 2023-02-22 LAB — BLD GP AB SCN SERPL QL: NORMAL

## 2023-02-27 ENCOUNTER — APPOINTMENT (OUTPATIENT)
Dept: OBGYN | Facility: CLINIC | Age: 38
End: 2023-02-27
Payer: MEDICAID

## 2023-02-27 VITALS — SYSTOLIC BLOOD PRESSURE: 102 MMHG | DIASTOLIC BLOOD PRESSURE: 66 MMHG | BODY MASS INDEX: 29.21 KG/M2 | WEIGHT: 181 LBS

## 2023-02-27 LAB
BILIRUB UR QL STRIP: NORMAL
GLUCOSE UR-MCNC: NORMAL
HCG UR QL: 1 EU/DL
HGB UR QL STRIP.AUTO: NORMAL
KETONES UR-MCNC: NORMAL
LEUKOCYTE ESTERASE UR QL STRIP: NORMAL
NITRITE UR QL STRIP: NORMAL
PH UR STRIP: 6.5
PROT UR STRIP-MCNC: NORMAL
SP GR UR STRIP: 1.01

## 2023-02-27 PROCEDURE — 81003 URINALYSIS AUTO W/O SCOPE: CPT | Mod: QW

## 2023-02-27 PROCEDURE — 99213 OFFICE O/P EST LOW 20 MIN: CPT | Mod: TH

## 2023-03-09 ENCOUNTER — APPOINTMENT (OUTPATIENT)
Dept: OBGYN | Facility: CLINIC | Age: 38
End: 2023-03-09

## 2023-03-13 ENCOUNTER — NON-APPOINTMENT (OUTPATIENT)
Age: 38
End: 2023-03-13

## 2023-03-13 ENCOUNTER — APPOINTMENT (OUTPATIENT)
Dept: OBGYN | Facility: CLINIC | Age: 38
End: 2023-03-13
Payer: MEDICAID

## 2023-03-13 VITALS — DIASTOLIC BLOOD PRESSURE: 79 MMHG | SYSTOLIC BLOOD PRESSURE: 120 MMHG | WEIGHT: 182 LBS | BODY MASS INDEX: 29.38 KG/M2

## 2023-03-13 DIAGNOSIS — O09.529 SUPERVISION OF ELDERLY MULTIGRAVIDA, UNSPECIFIED TRIMESTER: ICD-10-CM

## 2023-03-13 LAB
BILIRUB UR QL STRIP: NORMAL
GLUCOSE UR-MCNC: NORMAL
HCG UR QL: 4 EU/DL
HGB UR QL STRIP.AUTO: NORMAL
KETONES UR-MCNC: NORMAL
LEUKOCYTE ESTERASE UR QL STRIP: NORMAL
NITRITE UR QL STRIP: NORMAL
PH UR STRIP: 6.5
PROT UR STRIP-MCNC: NORMAL
SP GR UR STRIP: 1.02

## 2023-03-13 PROCEDURE — 99213 OFFICE O/P EST LOW 20 MIN: CPT | Mod: TH,25

## 2023-03-13 PROCEDURE — 81003 URINALYSIS AUTO W/O SCOPE: CPT | Mod: QW

## 2023-03-13 PROCEDURE — 76818 FETAL BIOPHYS PROFILE W/NST: CPT

## 2023-03-15 ENCOUNTER — APPOINTMENT (OUTPATIENT)
Dept: OBGYN | Facility: CLINIC | Age: 38
End: 2023-03-15
Payer: MEDICAID

## 2023-03-15 VITALS — WEIGHT: 181 LBS | BODY MASS INDEX: 29.21 KG/M2 | DIASTOLIC BLOOD PRESSURE: 73 MMHG | SYSTOLIC BLOOD PRESSURE: 110 MMHG

## 2023-03-15 LAB
BILIRUB UR QL STRIP: NORMAL
GLUCOSE UR-MCNC: NORMAL
HCG UR QL: 4 EU/DL
HGB UR QL STRIP.AUTO: NORMAL
KETONES UR-MCNC: NORMAL
LEUKOCYTE ESTERASE UR QL STRIP: NORMAL
NITRITE UR QL STRIP: NORMAL
PH UR STRIP: 7
PROT UR STRIP-MCNC: NORMAL
SP GR UR STRIP: 1.02

## 2023-03-15 PROCEDURE — 99213 OFFICE O/P EST LOW 20 MIN: CPT | Mod: TH,25

## 2023-03-15 PROCEDURE — 81003 URINALYSIS AUTO W/O SCOPE: CPT | Mod: QW

## 2023-03-15 PROCEDURE — 76818 FETAL BIOPHYS PROFILE W/NST: CPT

## 2023-03-19 ENCOUNTER — NON-APPOINTMENT (OUTPATIENT)
Age: 38
End: 2023-03-19

## 2023-03-20 ENCOUNTER — APPOINTMENT (OUTPATIENT)
Dept: OBGYN | Facility: CLINIC | Age: 38
End: 2023-03-20
Payer: MEDICAID

## 2023-03-20 VITALS — DIASTOLIC BLOOD PRESSURE: 71 MMHG | BODY MASS INDEX: 29.05 KG/M2 | SYSTOLIC BLOOD PRESSURE: 110 MMHG | WEIGHT: 180 LBS

## 2023-03-20 LAB
BILIRUB UR QL STRIP: NEGATIVE
GLUCOSE UR-MCNC: NEGATIVE
HCG UR QL: 2 EU/DL
HGB UR QL STRIP.AUTO: NORMAL
KETONES UR-MCNC: NEGATIVE
LEUKOCYTE ESTERASE UR QL STRIP: NEGATIVE
NITRITE UR QL STRIP: NEGATIVE
PH UR STRIP: 6
PROT UR STRIP-MCNC: NEGATIVE
SP GR UR STRIP: 1.02

## 2023-03-20 PROCEDURE — 76818 FETAL BIOPHYS PROFILE W/NST: CPT

## 2023-03-20 PROCEDURE — 81003 URINALYSIS AUTO W/O SCOPE: CPT | Mod: QW

## 2023-03-20 PROCEDURE — 99213 OFFICE O/P EST LOW 20 MIN: CPT | Mod: TH,25

## 2023-03-21 ENCOUNTER — INPATIENT (INPATIENT)
Facility: HOSPITAL | Age: 38
LOS: 1 days | Discharge: HOME | DRG: 560 | End: 2023-03-23
Attending: OBSTETRICS & GYNECOLOGY | Admitting: OBSTETRICS & GYNECOLOGY
Payer: MEDICAID

## 2023-03-21 ENCOUNTER — NON-APPOINTMENT (OUTPATIENT)
Age: 38
End: 2023-03-21

## 2023-03-21 VITALS — HEART RATE: 69 BPM | DIASTOLIC BLOOD PRESSURE: 66 MMHG | SYSTOLIC BLOOD PRESSURE: 122 MMHG

## 2023-03-21 DIAGNOSIS — O26.899 OTHER SPECIFIED PREGNANCY RELATED CONDITIONS, UNSPECIFIED TRIMESTER: ICD-10-CM

## 2023-03-21 LAB
BASOPHILS # BLD AUTO: 0.06 K/UL — SIGNIFICANT CHANGE UP (ref 0–0.2)
BASOPHILS NFR BLD AUTO: 0.4 % — SIGNIFICANT CHANGE UP (ref 0–1)
BLD GP AB SCN SERPL QL: SIGNIFICANT CHANGE UP
EOSINOPHIL # BLD AUTO: 0.07 K/UL — SIGNIFICANT CHANGE UP (ref 0–0.7)
EOSINOPHIL NFR BLD AUTO: 0.5 % — SIGNIFICANT CHANGE UP (ref 0–8)
HCT VFR BLD CALC: 39.8 % — SIGNIFICANT CHANGE UP (ref 37–47)
HGB BLD-MCNC: 13.7 G/DL — SIGNIFICANT CHANGE UP (ref 12–16)
IMM GRANULOCYTES NFR BLD AUTO: 1.1 % — HIGH (ref 0.1–0.3)
LYMPHOCYTES # BLD AUTO: 17.6 % — LOW (ref 20.5–51.1)
LYMPHOCYTES # BLD AUTO: 2.35 K/UL — SIGNIFICANT CHANGE UP (ref 1.2–3.4)
MCHC RBC-ENTMCNC: 32.2 PG — HIGH (ref 27–31)
MCHC RBC-ENTMCNC: 34.4 G/DL — SIGNIFICANT CHANGE UP (ref 32–37)
MCV RBC AUTO: 93.6 FL — SIGNIFICANT CHANGE UP (ref 81–99)
MONOCYTES # BLD AUTO: 1.19 K/UL — HIGH (ref 0.1–0.6)
MONOCYTES NFR BLD AUTO: 8.9 % — SIGNIFICANT CHANGE UP (ref 1.7–9.3)
NEUTROPHILS # BLD AUTO: 9.54 K/UL — HIGH (ref 1.4–6.5)
NEUTROPHILS NFR BLD AUTO: 71.5 % — SIGNIFICANT CHANGE UP (ref 42.2–75.2)
NRBC # BLD: 0 /100 WBCS — SIGNIFICANT CHANGE UP (ref 0–0)
PLATELET # BLD AUTO: 217 K/UL — SIGNIFICANT CHANGE UP (ref 130–400)
PRENATAL SYPHILIS TEST: SIGNIFICANT CHANGE UP
RBC # BLD: 4.25 M/UL — SIGNIFICANT CHANGE UP (ref 4.2–5.4)
RBC # FLD: 13.2 % — SIGNIFICANT CHANGE UP (ref 11.5–14.5)
SARS-COV-2 RNA SPEC QL NAA+PROBE: SIGNIFICANT CHANGE UP
WBC # BLD: 13.36 K/UL — HIGH (ref 4.8–10.8)
WBC # FLD AUTO: 13.36 K/UL — HIGH (ref 4.8–10.8)

## 2023-03-21 PROCEDURE — 59050 FETAL MONITOR W/REPORT: CPT

## 2023-03-21 PROCEDURE — 86592 SYPHILIS TEST NON-TREP QUAL: CPT

## 2023-03-21 PROCEDURE — 86901 BLOOD TYPING SEROLOGIC RH(D): CPT

## 2023-03-21 PROCEDURE — 86850 RBC ANTIBODY SCREEN: CPT

## 2023-03-21 PROCEDURE — 59409 OBSTETRICAL CARE: CPT | Mod: U9

## 2023-03-21 PROCEDURE — 90384 RH IG FULL-DOSE IM: CPT

## 2023-03-21 PROCEDURE — 36415 COLL VENOUS BLD VENIPUNCTURE: CPT

## 2023-03-21 PROCEDURE — 86769 SARS-COV-2 COVID-19 ANTIBODY: CPT

## 2023-03-21 PROCEDURE — 85025 COMPLETE CBC W/AUTO DIFF WBC: CPT

## 2023-03-21 PROCEDURE — 86900 BLOOD TYPING SEROLOGIC ABO: CPT

## 2023-03-21 PROCEDURE — 85461 HEMOGLOBIN FETAL: CPT

## 2023-03-21 PROCEDURE — 87635 SARS-COV-2 COVID-19 AMP PRB: CPT

## 2023-03-21 RX ORDER — PRAMOXINE HYDROCHLORIDE 150 MG/15G
1 AEROSOL, FOAM RECTAL EVERY 4 HOURS
Refills: 0 | Status: DISCONTINUED | OUTPATIENT
Start: 2023-03-21 | End: 2023-03-23

## 2023-03-21 RX ORDER — IBUPROFEN 200 MG
600 TABLET ORAL EVERY 6 HOURS
Refills: 0 | Status: COMPLETED | OUTPATIENT
Start: 2023-03-21 | End: 2024-02-17

## 2023-03-21 RX ORDER — SODIUM CHLORIDE 9 MG/ML
3 INJECTION INTRAMUSCULAR; INTRAVENOUS; SUBCUTANEOUS EVERY 8 HOURS
Refills: 0 | Status: DISCONTINUED | OUTPATIENT
Start: 2023-03-21 | End: 2023-03-23

## 2023-03-21 RX ORDER — OXYCODONE HYDROCHLORIDE 5 MG/1
5 TABLET ORAL
Refills: 0 | Status: DISCONTINUED | OUTPATIENT
Start: 2023-03-21 | End: 2023-03-23

## 2023-03-21 RX ORDER — ACETAMINOPHEN 500 MG
975 TABLET ORAL
Refills: 0 | Status: DISCONTINUED | OUTPATIENT
Start: 2023-03-21 | End: 2023-03-23

## 2023-03-21 RX ORDER — ACETAMINOPHEN 500 MG
1000 TABLET ORAL ONCE
Refills: 0 | Status: COMPLETED | OUTPATIENT
Start: 2023-03-21 | End: 2023-03-21

## 2023-03-21 RX ORDER — SODIUM CHLORIDE 9 MG/ML
1000 INJECTION, SOLUTION INTRAVENOUS
Refills: 0 | Status: DISCONTINUED | OUTPATIENT
Start: 2023-03-21 | End: 2023-03-21

## 2023-03-21 RX ORDER — SIMETHICONE 80 MG/1
80 TABLET, CHEWABLE ORAL EVERY 4 HOURS
Refills: 0 | Status: DISCONTINUED | OUTPATIENT
Start: 2023-03-21 | End: 2023-03-23

## 2023-03-21 RX ORDER — TETANUS TOXOID, REDUCED DIPHTHERIA TOXOID AND ACELLULAR PERTUSSIS VACCINE, ADSORBED 5; 2.5; 8; 8; 2.5 [IU]/.5ML; [IU]/.5ML; UG/.5ML; UG/.5ML; UG/.5ML
0.5 SUSPENSION INTRAMUSCULAR ONCE
Refills: 0 | Status: DISCONTINUED | OUTPATIENT
Start: 2023-03-21 | End: 2023-03-23

## 2023-03-21 RX ORDER — OXYCODONE HYDROCHLORIDE 5 MG/1
5 TABLET ORAL ONCE
Refills: 0 | Status: DISCONTINUED | OUTPATIENT
Start: 2023-03-21 | End: 2023-03-23

## 2023-03-21 RX ORDER — HYDROCORTISONE 1 %
1 OINTMENT (GRAM) TOPICAL EVERY 6 HOURS
Refills: 0 | Status: DISCONTINUED | OUTPATIENT
Start: 2023-03-21 | End: 2023-03-23

## 2023-03-21 RX ORDER — KETOROLAC TROMETHAMINE 30 MG/ML
30 SYRINGE (ML) INJECTION ONCE
Refills: 0 | Status: DISCONTINUED | OUTPATIENT
Start: 2023-03-21 | End: 2023-03-21

## 2023-03-21 RX ORDER — MAGNESIUM HYDROXIDE 400 MG/1
30 TABLET, CHEWABLE ORAL
Refills: 0 | Status: DISCONTINUED | OUTPATIENT
Start: 2023-03-21 | End: 2023-03-23

## 2023-03-21 RX ORDER — AER TRAVELER 0.5 G/1
1 SOLUTION RECTAL; TOPICAL EVERY 4 HOURS
Refills: 0 | Status: DISCONTINUED | OUTPATIENT
Start: 2023-03-21 | End: 2023-03-23

## 2023-03-21 RX ORDER — LANOLIN
1 OINTMENT (GRAM) TOPICAL EVERY 6 HOURS
Refills: 0 | Status: DISCONTINUED | OUTPATIENT
Start: 2023-03-21 | End: 2023-03-23

## 2023-03-21 RX ORDER — OXYTOCIN 10 UNIT/ML
333.33 VIAL (ML) INJECTION
Qty: 20 | Refills: 0 | Status: DISCONTINUED | OUTPATIENT
Start: 2023-03-21 | End: 2023-03-21

## 2023-03-21 RX ORDER — BENZOCAINE 10 %
1 GEL (GRAM) MUCOUS MEMBRANE EVERY 6 HOURS
Refills: 0 | Status: DISCONTINUED | OUTPATIENT
Start: 2023-03-21 | End: 2023-03-23

## 2023-03-21 RX ORDER — OXYTOCIN 10 UNIT/ML
41.67 VIAL (ML) INJECTION
Qty: 20 | Refills: 0 | Status: DISCONTINUED | OUTPATIENT
Start: 2023-03-21 | End: 2023-03-23

## 2023-03-21 RX ORDER — IBUPROFEN 200 MG
600 TABLET ORAL EVERY 6 HOURS
Refills: 0 | Status: DISCONTINUED | OUTPATIENT
Start: 2023-03-21 | End: 2023-03-23

## 2023-03-21 RX ORDER — DIPHENHYDRAMINE HCL 50 MG
25 CAPSULE ORAL EVERY 6 HOURS
Refills: 0 | Status: DISCONTINUED | OUTPATIENT
Start: 2023-03-21 | End: 2023-03-23

## 2023-03-21 RX ORDER — DIBUCAINE 1 %
1 OINTMENT (GRAM) RECTAL EVERY 6 HOURS
Refills: 0 | Status: DISCONTINUED | OUTPATIENT
Start: 2023-03-21 | End: 2023-03-23

## 2023-03-21 RX ADMIN — SODIUM CHLORIDE 3 MILLILITER(S): 9 INJECTION INTRAMUSCULAR; INTRAVENOUS; SUBCUTANEOUS at 22:32

## 2023-03-21 RX ADMIN — Medication 30 MILLIGRAM(S): at 13:42

## 2023-03-21 RX ADMIN — Medication 125 MILLIUNIT(S)/MIN: at 13:42

## 2023-03-21 RX ADMIN — Medication 400 MILLIGRAM(S): at 15:42

## 2023-03-21 RX ADMIN — Medication 0.2 MILLIGRAM(S): at 14:43

## 2023-03-21 NOTE — OB PROVIDER H&P - NS_PELVIS_OBGYN_ALL_OB
39 y/o female with no pmhx , non smoker, presents to the ED with left lower chest wall pain since Friday . patient twisted in bed when soreness started. patient persisted yesterday with movement. patient denies any leg swelling or pain. no sob, chest pain, diarphoreis, arm pain, neck or back pain, hemoptysis , coughing. patient with improvement of symptoms today.
Adequate

## 2023-03-21 NOTE — OB PROVIDER H&P - NS_OBGYNHISTORY_OBGYN_ALL_OB_FT
x 7  sp ab x 2  largest baby 10 lbs  preg #6 had tranfusion s/p sba and then developed adilene/JKA antibodies    gyn hx denies

## 2023-03-21 NOTE — OB PROVIDER H&P - ASSESSMENT
a/p iup at 41+ wks         grand multiparity with previous h/o hemorrhage/transfusion         fully dilated on arrival and escorted to Labor room #5         Pt evaluated by dr kessler and preciptously delivered

## 2023-03-21 NOTE — OB PROVIDER H&P - HISTORY OF PRESENT ILLNESS
at 41.3 wks gestation arrived to Labor room fully dilated. Pt with srom and moderate mec noted on arrival. +fm

## 2023-03-21 NOTE — OB PROVIDER DELIVERY SUMMARY - NSPROVIDERDELIVERYNOTE_OBGYN_ALL_OB_FT
Pt came in fully dilated, ruptured right before delivery and delivered a viable male infant over 1st degree laceration  placenta delivered intact and spont   pt stable mild lochia   uterus firm   pedes present for delivery for heavy meconium  pt stable   baby to reg nursery   laceration reapproximated with 2-0 chromic suture in a usual fashion   pt stable

## 2023-03-21 NOTE — OB PROVIDER H&P - NS_EDDCALCULATED_OBGYN_ALL_OB
Chief Complaint   Patient presents with   • Altered Mental Status        HPI: Patient is a 87 y/o female with h/o HTN, DM, CAD, GERD, and HLD, presenting to the ED via EMS c/o generalized weakness and nausea, onset earlier today. Pt was seen in the ED on 3/23 for fatigue and nausea, and labs showed a low potassium and magnesium. She was given potassium chloride and magnesium oxide in the ED and was discharged back to [facility]. However, pt did not feel well and was sent to the ED yesterday. It was found that she had nitrites in her urine, and she was discharged with Keflex. Pt continues to not feel well and so her care facility called EMS to have her evaluated in the ED. She complains of nausea, low back pain, and she feels more weak today. Pt denies cough, dizziness, headache, sore throat, SOB, diarrhea, vomiting, or any other complaints. Pt is from [facility], and pt is currently under investigation for COVID-19.     Proper PPE was used when examining this patient.       Review of Systems   Constitutional: Positive for fatigue. Negative for chills and fever.   Respiratory: Negative for cough and shortness of breath.    Cardiovascular: Negative for chest pain.   Gastrointestinal: Positive for nausea. Negative for diarrhea and vomiting.   Musculoskeletal: Positive for back pain.   Skin: Negative for rash.   Neurological: Negative for dizziness and headaches.   All other systems reviewed and are negative.         ALLERGIES:   Allergen Reactions   • Lisinopril Other (See Comments)       PAST MEDICAL HISTORY:    Essential (primary) hypertension                              Diabetes mellitus (CMS/HCC)                                   Uncomplicated senile dementia (CMS/HCC)                       Coronary artery disease                                       Depression                                                    Gastroesophageal reflux disease                               Arthritis                                                      High cholesterol                                              Diverticulosis                                                Insomnia                                                      PAST SURGICAL HISTORY:  Patient denies any pertinent surgical Hx.    Social History     Tobacco Use   • Smoking status: Never Smoker   • Smokeless tobacco: Never Used   Substance Use Topics   • Alcohol use: Not Currently   • Drug use: Never    Pt lives at [facility].       Patient denies any family history pertinent to chief complaint.      ED Triage Vitals [03/26/20 0945]   Enc Vitals Group      BP (!) 172/58      Heart Rate (!) 58      Resp (!) 23      Temp 98.6 °F (37 °C)      Temp src Oral      SpO2 96 %      Weight 161 lb 9.6 oz (73.3 kg)       Physical Exam   Constitutional: She is oriented to person, place, and time. No distress.   Alert. Answering questions appropriately.    HENT:   Head: Normocephalic and atraumatic.   Dry oral mucosa.    Eyes: Pupils are equal, round, and reactive to light. Conjunctivae and EOM are normal. No scleral icterus.   Neck: Neck supple.   Trachea midline   Cardiovascular: Normal rate, regular rhythm and normal heart sounds. Exam reveals no gallop.   No murmur heard.  Normal peripheral perfusion.   Pulmonary/Chest: Effort normal and breath sounds normal. No respiratory distress. She has no wheezes. She has no rhonchi. She has no rales.   Abdominal: Soft. She exhibits no distension. There is no abdominal tenderness. There is no rebound and no guarding.   Musculoskeletal: Normal range of motion.         General: No deformity.   Neurological: She is alert and oriented to person, place, and time. She has normal motor skills.   Normal Speech. No focal neruo deficits.   Skin: Skin is warm and dry. No rash noted.   Psychiatric: Mood and affect normal.   Cooperative.    Nursing note and vitals reviewed.         ECG Read Date: 03/26/20  ECG Read Time:  1036  Interpretation: Not a STEMI  Comment: Junctional rhythm. Rate is 55 bpm. Incomplete RBBB. QTc is 470ms. QRS is 100ms.        Labwork:    Results for orders placed or performed during the hospital encounter of 03/26/20   CBC with Automated Differential   Result Value    WBC 1.8 (L)    RBC 4.17    HGB 11.9 (L)    HCT 36.1    MCV 86.6    MCH 28.5    MCHC 33.0    RDW-CV 14.7        NRBC 0    DIFF TYPE MANUAL DIFFERENTIAL    SEG 50    LYMPH 36    MONO 12    EOS 1    BASO 1    Absolute Neut 0.9 (L)    Absolute Lymph 0.6 (L)    Absolute Mono 0.2 (L)    Absolute Eos 0.0 (L)    Absolute Baso 0.0    RBC MORPHOLOGY NORMAL    WBC MORPHOLOGY NORMAL    PLATELETS APPEAR NORMAL   Comprehensive Metabolic Panel   Result Value    Sodium 145    Potassium 2.9 (L)    Chloride 109 (H)    Carbon Dioxide 26    Anion Gap 13    Glucose 98    BUN 12    Creatinine 0.71    GFR Estimate,  88     Comment: eGFR 60 - 89 mL/min/1.73m2 = Mild decrease in kidney function.    GFR Estimate, Non  76     Comment: eGFR 60 - 89 mL/min/1.73m2 = Mild decrease in kidney function.    BUN/Creatinine Ratio 17    CALCIUM 8.2 (L)    TOTAL BILIRUBIN 0.4    AST/SGOT 52 (H)    ALT/SGPT 33    ALK PHOSPHATASE 84    TOTAL PROTEIN 6.4    Albumin 3.2 (L)    GLOBULIN 3.2    A/G Ratio, Serum 1.0   Prothrombin Time   Result Value    PROTIME 10.6    INR 1.0     Comment: INR Therapeutic Range: 2.0 to 3.0 (2.5 to 3.5 recommended for recurrent thrombotic episodes and mechanical prosthetic heart valves.)    Partial Thromboplastin Time   Result Value    PTT 25     Comment: PTT  Therapeutic Range:  45-70 seconds.   Lactic Acid, Venous   Result Value    Lactic Acid Venous 1.1   C Reactive Protein   Result Value    C-REACTIVE PROTEIN 0.5   Magnesium   Result Value    MAGNESIUM 1.7   Ferritin   Result Value    Ferritin 221   Creatine Kinase   Result Value       Procalcitonin   Result Value    PROCALCITONIN <0.05     Comment:     Bacterial Sepsis:  < 0.5 ng/mL Sepsis not likely. Localized bacterial infection possible  = 0.5 - 2 ng/mL Sepsis or other conditions possible  > 2 ng/mL High risk of sepsis/septic shock     NOTE: If bacterial sepsis is of high clinical suspicion but PCT<2 ng/mL, consider recheck PCT 6-24 hours after initial test.     Lower Respiratory Tract Infection (LRTI):  <0.1 ng/mL Bacterial infection highly unlikely  0.1 - 0.25 ng/mL Bacterial infection unlikely  0.26 - 0.5 ng/mL Bacterial infection likely  > 0.5 ng/mL Bacterial infection highly likely     NOTE: Patients with advanced CKD or medical/surgical trauma may have elevated baseline PCT (>0.5 ng/mL) in the absence of bacterial infection. If bacterial infection is suspected, consider repeat PCT in 2 to 4 days to guide de-escalation or   discontinuation of antibiotic therapy.     Higher reference range cutoffs may be appropriate for patients less than 3 days old.          Imaging Results    None           Medications   ondansetron (ZOFRAN) injection 4 mg (4 mg Intravenous Not Given 3/26/20 1200)   sodium chloride 0.9 % flush bag 25 mL (has no administration in time range)   sodium chloride (PF) 0.9 % injection 2 mL (has no administration in time range)   heparin (porcine) injection 5,000 Units (has no administration in time range)   magnesium sulfate 2 g in 50 mL premix IVPB (has no administration in time range)   acetaminophen (TYLENOL) tablet 650 mg (has no administration in time range)   aspirin chewable 81 mg (has no administration in time range)   atorvastatin (LIPITOR) tablet 10 mg (has no administration in time range)   cholecalciferol (VITAMIN D) tablet 50 mcg (has no administration in time range)   cycloSPORINE (RESTASIS) 0.05 % ophthalmic emulsion 1 drop (has no administration in time range)   donepezil (ARICEPT) tablet 10 mg (has no administration in time range)   guaifenesin 100 MG/5ML solution 200 mg (has no administration in time range)    hydroxychloroquine (PLAQUENIL) tablet 200 mg (has no administration in time range)   loperamide (IMODIUM) capsule 2 mg (has no administration in time range)   mirabegron ER (MYRBETRIQ) 24 hour tablet 50 mg (has no administration in time range)   mirtazapine (REMERON) tablet 45 mg (has no administration in time range)   pantoprazole (PROTONIX) EC tablet 40 mg (has no administration in time range)   polyethylene glycol (MIRALAX) packet 17 g (has no administration in time range)   psyllium (METAMUCIL MULTIHEALTH FIBER) 58.12 % packet 1 packet (has no administration in time range)   QUEtiapine (SEROquel) tablet 50 mg (has no administration in time range)   QUEtiapine (SEROquel) tablet 100 mg (has no administration in time range)   vortioxetine (TRINTELLIX) tablet 10 mg (has no administration in time range)   cefepime (MAXIPIME) 1,000 mg in sodium chloride 0.9 % 100 mL IVPB (has no administration in time range)   hydrALAZINE (APRESOLINE) injection 10 mg (has no administration in time range)   nebivolol (BYSTOLIC) 40 mg (has no administration in time range)   amLODIPine (NORVASC) tablet 5 mg (has no administration in time range)   cefepime (MAXIPIME) 1,000 mg in sodium chloride 0.9 % 100 mL IVPB (0 mg Intravenous Completed 3/26/20 1215)   sodium chloride (NORMAL SALINE) 0.9 % bolus 500 mL (0 mLs Intravenous Completed 3/26/20 1243)   potassium CHLORIDE ER tablet 40 mEq (40 mEq Oral Given 3/26/20 1240)       ED Course as of Mar 26 1740   Thu Mar 26, 2020   1006 Discussed case and admission with Dr. Willingham, internal medicine.       1010 Discussed case with Dr. Bowden, intensivist. Updated pt was tested for COVID-19 yesterday, but test has not been resulted.       1116 Pt's urine showed positive Nitrites yesterday. Will admit for further treatment of her UTI. Discussed admission plan with patient for further evaluation and treatment. Patient agrees with admission.      1157 Discussed case with AMARILYS Escobedo.             Vitals:    03/26/20 1200 03/26/20 1300 03/26/20 1400 03/26/20 1503   BP:  (!) 95/13 (!) 200/71 (!) 198/73   Pulse: (!) 57 67 64 (!) 108   Resp: (!) 23 18 (!) 27 16   Temp:    97.3 °F (36.3 °C)   TempSrc:       SpO2: 96% 95% 97% (!) 28%   Weight:           MDM:    Based on my history, physical exam, and diagnostic evaluation, the patient appears to have symptoms consistent with a urinary tract infection.  Patient did have positive nitrates on admission to the ER yesterday and was sent home with antibiotics.  Patient was dizzy today and not feeling well, per nursing home was with altered mental status.  Here patient is having diarrhea after antibiotics were given.  She states she is nauseous and does not feel well and does not know why she was sent home yesterday.  She is alert and aware and answering questions appropriately.  She denies any headache or numbness or weakness.  She has no other complaints.  Patient will be admitted for IV antibiotics.  Patient had a pandemic viral test sent to the [facility] yesterday.  I discussed case with intensivist who asked to do a in-house pandemic test to obtain results in the morning.  Patient will be admitted to the [facility] hospitalist.  I did discuss the case with patient's primary care physician as well.        ED Diagnoses        Final diagnoses    Weakness generalized          Complicated UTI (urinary tract infection)                 DISPOSITION: Admission      Tricia Rivas is acting as a medical scribe for ED Clinician Dr. Randall on 3/26/2020   This documentation accurately reflects the work and decisions made by me, Kacy Randall D.O.       Kacy Randall, DO  03/26/20 1748     First Trimester Sonogram

## 2023-03-21 NOTE — OB RN PATIENT PROFILE - FALL HARM RISK - HARM RISK INTERVENTIONS

## 2023-03-21 NOTE — OB RN DELIVERY SUMMARY - NSSELHIDDEN_OBGYN_ALL_OB_FT
[NS_DeliveryAttending1_OBGYN_ALL_OB_FT:[email or web address]=],[NS_DeliveryRN_OBGYN_ALL_OB_FT:[email or web address]=]

## 2023-03-21 NOTE — OB PROVIDER H&P - NSMODPPHRISK_OBGYN_A_OB
More than 4 previous vaginal births/History of previous postpartum hemorrhage/Precipitous delivery/AMA - over 35 years of age

## 2023-03-22 ENCOUNTER — APPOINTMENT (OUTPATIENT)
Dept: OBGYN | Facility: CLINIC | Age: 38
End: 2023-03-22

## 2023-03-22 ENCOUNTER — NON-APPOINTMENT (OUTPATIENT)
Age: 38
End: 2023-03-22

## 2023-03-22 LAB
BASOPHILS # BLD AUTO: 0.05 K/UL — SIGNIFICANT CHANGE UP (ref 0–0.2)
BASOPHILS NFR BLD AUTO: 0.5 % — SIGNIFICANT CHANGE UP (ref 0–1)
COVID-19 SPIKE DOMAIN AB INTERP: POSITIVE
COVID-19 SPIKE DOMAIN ANTIBODY RESULT: >250 U/ML — HIGH
EOSINOPHIL # BLD AUTO: 0.11 K/UL — SIGNIFICANT CHANGE UP (ref 0–0.7)
EOSINOPHIL NFR BLD AUTO: 1.1 % — SIGNIFICANT CHANGE UP (ref 0–8)
FETAL SCREEN: SIGNIFICANT CHANGE UP
HCT VFR BLD CALC: 38.2 % — SIGNIFICANT CHANGE UP (ref 37–47)
HGB BLD-MCNC: 12.6 G/DL — SIGNIFICANT CHANGE UP (ref 12–16)
IMM GRANULOCYTES NFR BLD AUTO: 0.8 % — HIGH (ref 0.1–0.3)
LYMPHOCYTES # BLD AUTO: 1.94 K/UL — SIGNIFICANT CHANGE UP (ref 1.2–3.4)
LYMPHOCYTES # BLD AUTO: 18.6 % — LOW (ref 20.5–51.1)
MCHC RBC-ENTMCNC: 32 PG — HIGH (ref 27–31)
MCHC RBC-ENTMCNC: 33 G/DL — SIGNIFICANT CHANGE UP (ref 32–37)
MCV RBC AUTO: 97 FL — SIGNIFICANT CHANGE UP (ref 81–99)
MONOCYTES # BLD AUTO: 0.89 K/UL — HIGH (ref 0.1–0.6)
MONOCYTES NFR BLD AUTO: 8.5 % — SIGNIFICANT CHANGE UP (ref 1.7–9.3)
NEUTROPHILS # BLD AUTO: 7.34 K/UL — HIGH (ref 1.4–6.5)
NEUTROPHILS NFR BLD AUTO: 70.5 % — SIGNIFICANT CHANGE UP (ref 42.2–75.2)
NRBC # BLD: 0 /100 WBCS — SIGNIFICANT CHANGE UP (ref 0–0)
PLATELET # BLD AUTO: 190 K/UL — SIGNIFICANT CHANGE UP (ref 130–400)
RBC # BLD: 3.94 M/UL — LOW (ref 4.2–5.4)
RBC # FLD: 13.3 % — SIGNIFICANT CHANGE UP (ref 11.5–14.5)
SARS-COV-2 IGG+IGM SERPL QL IA: >250 U/ML — HIGH
SARS-COV-2 IGG+IGM SERPL QL IA: POSITIVE
WBC # BLD: 10.41 K/UL — SIGNIFICANT CHANGE UP (ref 4.8–10.8)
WBC # FLD AUTO: 10.41 K/UL — SIGNIFICANT CHANGE UP (ref 4.8–10.8)

## 2023-03-22 RX ADMIN — SODIUM CHLORIDE 3 MILLILITER(S): 9 INJECTION INTRAMUSCULAR; INTRAVENOUS; SUBCUTANEOUS at 15:22

## 2023-03-22 RX ADMIN — Medication 600 MILLIGRAM(S): at 06:37

## 2023-03-22 RX ADMIN — SODIUM CHLORIDE 3 MILLILITER(S): 9 INJECTION INTRAMUSCULAR; INTRAVENOUS; SUBCUTANEOUS at 06:08

## 2023-03-22 RX ADMIN — SODIUM CHLORIDE 3 MILLILITER(S): 9 INJECTION INTRAMUSCULAR; INTRAVENOUS; SUBCUTANEOUS at 21:48

## 2023-03-22 RX ADMIN — Medication 600 MILLIGRAM(S): at 12:00

## 2023-03-22 RX ADMIN — Medication 1 TABLET(S): at 12:00

## 2023-03-22 NOTE — PROGRESS NOTE ADULT - SUBJECTIVE AND OBJECTIVE BOX
OB attending  PPD #1    Pt doing well, pain well controlled. No overnight events, no acute complaints.    Ambulating: Yes  Voiding: Yes  Flatus: Yes  Bowel movements: Yes   Breast or bottle feeding: Breastfeeding  Diet: Regular    PAST MEDICAL & SURGICAL HISTORY:  No pertinent past medical history      No significant past surgical history          Physical Exam  Vital Signs Last 24 Hrs  T(C): 36.8 (21 Mar 2023 23:31), Max: 37 (21 Mar 2023 13:12)  T(F): 98.2 (21 Mar 2023 23:31), Max: 98.6 (21 Mar 2023 13:12)  HR: 98 (21 Mar 2023 23:31) (52 - 98)  BP: 114/67 (21 Mar 2023 23:31) (97/61 - 122/66)  BP(mean): --  RR: 18 (21 Mar 2023 23:31) (18 - 22)  SpO2: --      Gen: AAOx3, NAD  Abd: Soft, nontender, nondistended, BS+  Fundus: Firm, below umbilicus  Lochia: normal  Ext: No calf tenderness, no swelling    Labs:                        13.7   13.36 )-----------( 217      ( 21 Mar 2023 13:19 )             39.8         A/P: s/p , PPD #1, doing well  - continue current management

## 2023-03-23 ENCOUNTER — TRANSCRIPTION ENCOUNTER (OUTPATIENT)
Age: 38
End: 2023-03-23

## 2023-03-23 VITALS
SYSTOLIC BLOOD PRESSURE: 95 MMHG | DIASTOLIC BLOOD PRESSURE: 61 MMHG | HEART RATE: 52 BPM | RESPIRATION RATE: 18 BRPM | TEMPERATURE: 98 F

## 2023-03-23 PROCEDURE — 99231 SBSQ HOSP IP/OBS SF/LOW 25: CPT

## 2023-03-23 RX ORDER — ACETAMINOPHEN 500 MG
3 TABLET ORAL
Qty: 0 | Refills: 0 | DISCHARGE
Start: 2023-03-23

## 2023-03-23 RX ADMIN — Medication 600 MILLIGRAM(S): at 11:49

## 2023-03-23 RX ADMIN — Medication 975 MILLIGRAM(S): at 08:29

## 2023-03-23 RX ADMIN — Medication 975 MILLIGRAM(S): at 09:00

## 2023-03-23 RX ADMIN — SODIUM CHLORIDE 3 MILLILITER(S): 9 INJECTION INTRAMUSCULAR; INTRAVENOUS; SUBCUTANEOUS at 06:01

## 2023-03-23 RX ADMIN — Medication 600 MILLIGRAM(S): at 11:16

## 2023-03-23 RX ADMIN — Medication 1 TABLET(S): at 11:17

## 2023-03-23 NOTE — DISCHARGE NOTE OB - PATIENT PORTAL LINK FT
You can access the FollowMyHealth Patient Portal offered by [facility] by registering at the following website: [email or web address]. By joining [employer]’s FollowMyHealth portal, you will also be able to view your health information using other applications (apps) compatible with our system.

## 2023-03-23 NOTE — DISCHARGE NOTE OB - NS MD DC FALL RISK RISK
For information on Fall & Injury Prevention, visit: [email or web address] OR  [email or web address] OR  https://www.cdc.gov/steadi/patient.html

## 2023-03-23 NOTE — PROGRESS NOTE ADULT - SUBJECTIVE AND OBJECTIVE BOX
Patient seen resting comfortably. No acute events overnight, no current complaints. Pain controlled with motrin. She reports lochia as minimal, no fevers, chills, nausea, vomiting, SOB, palpitations, dizziness. Patient is ambulating, tolerating diet, voiding freely without issue. Breastfeeding without difficulty.     Exam:  Gen: AAOx3  Breast: no engorgement, erythema, or tenderness  Abd: soft, non-tender, non-distended, uterus firm and to umbilicus  Ext: non-tender LE bilaterally    A: s/p NSD PPD#2, stable    Plan  - continue routine pp care  - encourage ambulating and breastfeeding  - motrin for pain

## 2023-03-23 NOTE — DISCHARGE NOTE OB - HOSPITAL COURSE
Patient admitted in labor, underwent uncomplicated vaginal delivery and had an uncomplicated postpartum course, discharged in stable condition.

## 2023-03-23 NOTE — DISCHARGE NOTE OB - CARE PROVIDER_API CALL
Jcaobo Wilder)  Obstetrics and Gynecology  [address] [place] [address]  Phone: [phone]  Fax: [phone]  Follow Up Time:

## 2023-03-27 DIAGNOSIS — Z20.822 CONTACT WITH AND (SUSPECTED) EXPOSURE TO COVID-19: ICD-10-CM

## 2023-03-27 DIAGNOSIS — Z3A.41 41 WEEKS GESTATION OF PREGNANCY: ICD-10-CM

## 2023-05-03 ENCOUNTER — APPOINTMENT (OUTPATIENT)
Dept: OBGYN | Facility: CLINIC | Age: 38
End: 2023-05-03
Payer: MEDICAID

## 2023-05-03 DIAGNOSIS — O48.0 POST-TERM PREGNANCY: ICD-10-CM

## 2023-05-03 DIAGNOSIS — Z32.01 ENCOUNTER FOR PREGNANCY TEST, RESULT POSITIVE: ICD-10-CM

## 2023-05-03 NOTE — HISTORY OF PRESENT ILLNESS
[Postpartum Follow Up] : postpartum follow up [Complications:___] : no complications [Last Pap Date: ___] : Last Pap Date: [unfilled] [Delivery Date: ___] : on [unfilled] [] : delivered by vaginal delivery [Male] : Delivery History: baby boy [Wt. ___] : weighing [unfilled] [Rhogam] : Rhogam administered [Breastfeeding] : currently nursing [S/Sx PP Depression] : no signs/symptoms of postpartum depression [Back to Normal] : is back to normal in size [Normal] : the vagina was normal [Cervix Sample Taken] : cervical sample taken for a Pap smear [Examination Of The Breasts] : breasts are normal [Doing Well] : is doing well [None] : None [FreeTextEntry8] : Here for PP exam  [de-identified] : P8 now  [de-identified] : PHQ neg , still has incontinence  [de-identified] : Nl PP , urinary incontinence [de-identified] : Pap done, Bse rev, No bc , urine  cx , to rafael Smith

## 2023-05-05 LAB
BACTERIA UR CULT: NORMAL
C TRACH RRNA SPEC QL NAA+PROBE: NOT DETECTED
N GONORRHOEA RRNA SPEC QL NAA+PROBE: NOT DETECTED
SOURCE TP AMPLIFICATION: NORMAL

## 2023-07-08 NOTE — DISCHARGE NOTE OB - PLAN OF CARE
Nothing inside the vagina for 6 weeks: no tampons, douching, sexual intercourse, tub baths or pools.   If you have a fever over 100.4F, severe abdominal pain or heavy vaginal bleeding please call your doctor or go to the emergency room. Not applicable

## 2023-10-19 DIAGNOSIS — O92.29 OTHER DISORDERS OF BREAST ASSOCIATED WITH PREGNANCY AND THE PUERPERIUM: ICD-10-CM

## 2023-10-19 RX ORDER — FLUCONAZOLE 150 MG/1
150 TABLET ORAL
Qty: 2 | Refills: 1 | Status: ACTIVE | COMMUNITY
Start: 2023-10-19 | End: 1900-01-01

## 2024-08-26 NOTE — ANESTHESIA FOLLOW-UP NOTE - TYPE OF ANESTHESIA
[FreeTextEntry1] : After discussing various treatment options with the patient including but not limited to oral medications, physical therapy, exercise, modalities as well as interventional spinal injections, we have decided with the following plan:  1. toradol shot today.   2) Patient is presenting with acute/sub-acute radicular pain with impairment in ADLs and functionality.  The pain has not responded sufficiently to  conservative care including nsaid therapy and/or physical therapy.  There is no bleeding tendency, unstable medical condition, or systemic infection. The purpose of the spinal injections is to facilitate active therapy by providing short term relief through reduction of pain and inflammation.   Injections, by themselves, are not likely to provide long-term relief. Rather, active rehabilitation with modified work achieves long-term relief by increasing active ROM, strength and stability. Patient is presenting with acute/sub-acute radicular pain with impairment in ADLs and functionality.  The pain has not responded sufficiently to  conservative care including nsaid therapy and/or physical therapy.  There is no bleeding tendency, unstable medical condition, or systemic infection. The purpose of the spinal injections is to facilitate active therapy by providing short term relief through reduction of pain and inflammation.   Injections, by themselves, are not likely to provide long-term relief. Rather, active rehabilitation with modified work achieves long-term relief by increasing active ROM, strength and stability. B/L L4/5 TFESI if no relief recommend MBB  3) A CT is indicated as there has been failure of numerous conservative therapies over the last 6-8 weeks. these include but r/o fracture are not limited to medication therapy and physical therapy. It is recognized that repeat imaging studies and other tests may be warranted by the clinical course and/or to follow the progress of treatment in some cases. It may be of value to repeat diagnostic procedures (ie imaging studies) during the course of care to reassess or stage the pathology when there is progression of symptoms or findings, prior to surgical interventions and/or therapeutic injections when clinically indicated.     [facility]

## 2024-12-04 ENCOUNTER — APPOINTMENT (OUTPATIENT)
Dept: OBGYN | Facility: CLINIC | Age: 39
End: 2024-12-04
Payer: MEDICAID

## 2024-12-04 ENCOUNTER — NON-APPOINTMENT (OUTPATIENT)
Age: 39
End: 2024-12-04

## 2024-12-04 VITALS
WEIGHT: 178 LBS | HEIGHT: 66 IN | SYSTOLIC BLOOD PRESSURE: 105 MMHG | DIASTOLIC BLOOD PRESSURE: 70 MMHG | HEART RATE: 89 BPM | BODY MASS INDEX: 28.61 KG/M2

## 2024-12-04 DIAGNOSIS — Z32.01 ENCOUNTER FOR PREGNANCY TEST, RESULT POSITIVE: ICD-10-CM

## 2024-12-04 DIAGNOSIS — Z01.419 ENCOUNTER FOR GYNECOLOGICAL EXAMINATION (GENERAL) (ROUTINE) W/OUT ABNORMAL FINDINGS: ICD-10-CM

## 2024-12-04 LAB
BILIRUB UR QL STRIP: NORMAL
GLUCOSE UR-MCNC: NORMAL
HCG UR QL: 1 EU/DL
HCG UR QL: POSITIVE
HGB UR QL STRIP.AUTO: NORMAL
KETONES UR-MCNC: NORMAL
LEUKOCYTE ESTERASE UR QL STRIP: NORMAL
NITRITE UR QL STRIP: NORMAL
PH UR STRIP: 6.5
PROT UR STRIP-MCNC: 30
SP GR UR STRIP: 1.02

## 2024-12-04 PROCEDURE — 99396 PREV VISIT EST AGE 40-64: CPT | Mod: 25

## 2024-12-04 PROCEDURE — 99459 PELVIC EXAMINATION: CPT

## 2024-12-04 PROCEDURE — 99213 OFFICE O/P EST LOW 20 MIN: CPT | Mod: TH,25

## 2024-12-04 PROCEDURE — 81003 URINALYSIS AUTO W/O SCOPE: CPT | Mod: QW

## 2024-12-04 PROCEDURE — 76801 OB US < 14 WKS SINGLE FETUS: CPT

## 2024-12-04 PROCEDURE — 81025 URINE PREGNANCY TEST: CPT

## 2024-12-05 LAB
ABO + RH PNL BLD: NORMAL
BASOPHILS # BLD AUTO: 0.06 K/UL
BASOPHILS NFR BLD AUTO: 0.6 %
BLD GP AB SCN SERPL QL: NORMAL
EOSINOPHIL # BLD AUTO: 0.17 K/UL
EOSINOPHIL NFR BLD AUTO: 1.7 %
HCT VFR BLD CALC: 35 %
HGB BLD-MCNC: 11.7 G/DL
IMM GRANULOCYTES NFR BLD AUTO: 0.9 %
LYMPHOCYTES # BLD AUTO: 1.63 K/UL
LYMPHOCYTES NFR BLD AUTO: 16.5 %
MAN DIFF?: NORMAL
MCHC RBC-ENTMCNC: 30.6 PG
MCHC RBC-ENTMCNC: 33.4 G/DL
MCV RBC AUTO: 91.6 FL
MONOCYTES # BLD AUTO: 0.69 K/UL
MONOCYTES NFR BLD AUTO: 7 %
NEUTROPHILS # BLD AUTO: 7.23 K/UL
NEUTROPHILS NFR BLD AUTO: 73.3 %
PLATELET # BLD AUTO: 244 K/UL
RBC # BLD: 3.82 M/UL
RBC # FLD: 13.3 %
T PALLIDUM AB SER QL IA: NEGATIVE
WBC # FLD AUTO: 9.87 K/UL

## 2024-12-06 LAB
B19V IGG SER QL IA: 2.49 INDEX
B19V IGG+IGM SER-IMP: NORMAL
B19V IGG+IGM SER-IMP: POSITIVE
B19V IGM FLD-ACNC: 0.12 INDEX
B19V IGM SER-ACNC: NEGATIVE
BACTERIA UR CULT: NORMAL
C TRACH RRNA SPEC QL NAA+PROBE: NOT DETECTED
HBV SURFACE AG SER QL: NONREACTIVE
HCV AB SER QL: NONREACTIVE
HCV S/CO RATIO: 0.1 S/CO
HIV1+2 AB SPEC QL IA.RAPID: NONREACTIVE
LEAD BLD-MCNC: <1 UG/DL
MEV IGG FLD QL IA: 24.2 AU/ML
MEV IGG+IGM SER-IMP: POSITIVE
MUV AB SER-ACNC: POSITIVE
MUV IGG SER QL IA: 41 AU/ML
N GONORRHOEA RRNA SPEC QL NAA+PROBE: NOT DETECTED
RUBV IGG FLD-ACNC: 4.42 INDEX
RUBV IGG SER-IMP: POSITIVE
SOURCE TP AMPLIFICATION: NORMAL
VZV AB TITR SER: POSITIVE
VZV IGG SER IF-ACNC: 6.78 S/CO

## 2025-01-06 ENCOUNTER — APPOINTMENT (OUTPATIENT)
Dept: OBGYN | Facility: CLINIC | Age: 40
End: 2025-01-06
Payer: MEDICAID

## 2025-01-06 VITALS
WEIGHT: 186 LBS | SYSTOLIC BLOOD PRESSURE: 100 MMHG | BODY MASS INDEX: 29.89 KG/M2 | DIASTOLIC BLOOD PRESSURE: 68 MMHG | HEIGHT: 66 IN | HEART RATE: 78 BPM

## 2025-01-06 DIAGNOSIS — Z34.90 ENCOUNTER FOR SUPERVISION OF NORMAL PREGNANCY, UNSPECIFIED, UNSPECIFIED TRIMESTER: ICD-10-CM

## 2025-01-06 LAB
BILIRUB UR QL STRIP: NORMAL
GLUCOSE UR-MCNC: NORMAL
HCG UR QL: 1 EU/DL
HGB UR QL STRIP.AUTO: NORMAL
KETONES UR-MCNC: NORMAL
LEUKOCYTE ESTERASE UR QL STRIP: NORMAL
NITRITE UR QL STRIP: NORMAL
PH UR STRIP: 5.5
PROT UR STRIP-MCNC: NORMAL
SP GR UR STRIP: 1.03

## 2025-01-06 PROCEDURE — 81003 URINALYSIS AUTO W/O SCOPE: CPT | Mod: QW

## 2025-01-06 PROCEDURE — 99213 OFFICE O/P EST LOW 20 MIN: CPT | Mod: TH,25

## 2025-02-19 ENCOUNTER — ASOB RESULT (OUTPATIENT)
Age: 40
End: 2025-02-19

## 2025-02-19 ENCOUNTER — APPOINTMENT (OUTPATIENT)
Dept: OBGYN | Facility: CLINIC | Age: 40
End: 2025-02-19
Payer: MEDICAID

## 2025-02-19 ENCOUNTER — NON-APPOINTMENT (OUTPATIENT)
Age: 40
End: 2025-02-19

## 2025-02-19 ENCOUNTER — APPOINTMENT (OUTPATIENT)
Dept: ANTEPARTUM | Facility: CLINIC | Age: 40
End: 2025-02-19

## 2025-02-19 VITALS
BODY MASS INDEX: 29.7 KG/M2 | HEART RATE: 88 BPM | DIASTOLIC BLOOD PRESSURE: 69 MMHG | WEIGHT: 184 LBS | SYSTOLIC BLOOD PRESSURE: 103 MMHG

## 2025-02-19 LAB
BILIRUB UR QL STRIP: NORMAL
GLUCOSE UR-MCNC: NORMAL
HCG UR QL: 0.2 EU/DL
HGB UR QL STRIP.AUTO: NORMAL
KETONES UR-MCNC: NORMAL
LEUKOCYTE ESTERASE UR QL STRIP: NORMAL
NITRITE UR QL STRIP: NORMAL
PH UR STRIP: 6
PROT UR STRIP-MCNC: NORMAL
SP GR UR STRIP: 1.02

## 2025-02-19 PROCEDURE — 99213 OFFICE O/P EST LOW 20 MIN: CPT | Mod: TH,25

## 2025-02-19 PROCEDURE — 81003 URINALYSIS AUTO W/O SCOPE: CPT | Mod: QW

## 2025-02-24 ENCOUNTER — NON-APPOINTMENT (OUTPATIENT)
Age: 40
End: 2025-02-24

## 2025-02-26 ENCOUNTER — NON-APPOINTMENT (OUTPATIENT)
Age: 40
End: 2025-02-26

## 2025-03-25 ENCOUNTER — NON-APPOINTMENT (OUTPATIENT)
Age: 40
End: 2025-03-25

## 2025-03-25 ENCOUNTER — APPOINTMENT (OUTPATIENT)
Dept: OBGYN | Facility: CLINIC | Age: 40
End: 2025-03-25
Payer: MEDICAID

## 2025-03-25 VITALS
SYSTOLIC BLOOD PRESSURE: 90 MMHG | BODY MASS INDEX: 29.35 KG/M2 | HEIGHT: 67 IN | WEIGHT: 187 LBS | HEART RATE: 83 BPM | DIASTOLIC BLOOD PRESSURE: 57 MMHG

## 2025-03-25 LAB
BILIRUB UR QL STRIP: NORMAL
GLUCOSE UR-MCNC: NORMAL
HCG UR QL: 0.2 EU/DL
HGB UR QL STRIP.AUTO: NORMAL
KETONES UR-MCNC: NORMAL
LEUKOCYTE ESTERASE UR QL STRIP: NORMAL
NITRITE UR QL STRIP: NORMAL
PH UR STRIP: 6
PROT UR STRIP-MCNC: NORMAL
SP GR UR STRIP: >=1.03

## 2025-03-25 PROCEDURE — 81003 URINALYSIS AUTO W/O SCOPE: CPT | Mod: QW

## 2025-03-25 PROCEDURE — 99213 OFFICE O/P EST LOW 20 MIN: CPT | Mod: TH,25

## 2025-03-25 PROCEDURE — 76819 FETAL BIOPHYS PROFIL W/O NST: CPT | Mod: 59

## 2025-03-25 PROCEDURE — 76816 OB US FOLLOW-UP PER FETUS: CPT

## 2025-03-25 PROCEDURE — 96372 THER/PROPH/DIAG INJ SC/IM: CPT

## 2025-03-26 LAB
BASOPHILS # BLD AUTO: 0.05 K/UL
BASOPHILS NFR BLD AUTO: 0.6 %
EOSINOPHIL # BLD AUTO: 0.18 K/UL
EOSINOPHIL NFR BLD AUTO: 2.1 %
GLUCOSE 1H P 50 G GLC PO SERPL-MCNC: 115 MG/DL
HCT VFR BLD CALC: 31.2 %
HGB BLD-MCNC: 10.2 G/DL
IMM GRANULOCYTES NFR BLD AUTO: 1 %
LYMPHOCYTES # BLD AUTO: 1.41 K/UL
LYMPHOCYTES NFR BLD AUTO: 16.2 %
MAN DIFF?: NORMAL
MCHC RBC-ENTMCNC: 31.3 PG
MCHC RBC-ENTMCNC: 32.7 G/DL
MCV RBC AUTO: 95.7 FL
MONOCYTES # BLD AUTO: 0.65 K/UL
MONOCYTES NFR BLD AUTO: 7.5 %
NEUTROPHILS # BLD AUTO: 6.32 K/UL
NEUTROPHILS NFR BLD AUTO: 72.6 %
PLATELET # BLD AUTO: 193 K/UL
RBC # BLD: 3.26 M/UL
RBC # FLD: 14.3 %
WBC # FLD AUTO: 8.7 K/UL

## 2025-03-26 RX ORDER — CHLORHEXIDINE GLUCONATE 4 %
325 (65 FE) LIQUID (ML) TOPICAL DAILY
Qty: 30 | Refills: 3 | Status: ACTIVE | COMMUNITY
Start: 2025-03-26 | End: 1900-01-01

## 2025-06-18 NOTE — DISCHARGE NOTE OB - PROVIDER RX CONTACT NUMBER
"Nutrition Initial Assessment:   Nutrition Assessment    Reason for Assessment: Provider consult order, Enteral assessment/recommendation (TF)    Patient is a 77 y.o. female presenting with fever.       Nutrition History:  Energy Intake: Poor < 50 %  Pain affecting nutrition status:  (SMOOTH)  Food and Nutrient History: Physician was in room talking with pt regarding PEG tube/nutrition at time of RD's attempted assessment. Pt then out of room for EGD and PEG placement. All hx obtained via chart review. Was recently seen by this service during last admission after hernia repair surgery <1 month ago, had appeared to be eating well at that time and drinking ONS TID, however, pt now with poor PO intake and tolerance. Had one documented meal intake this admission of 25%. Had ONS ordered this admission, unclear if pt was drinking these. Per physician, pt will not continue to have PO diet while receiving TF via PEG. All nutrition will need to be met via EN.  Vitamin/Herbal Supplement Use: Vitamin D3, flaxseed oil per home med list       Anthropometrics:  Height: 162.6 cm (5' 4\")   Weight: 73.9 kg (163 lb)   BMI (Calculated): 27.97  IBW/kg (Dietitian Calculated): 54.5 kg                         Weight History:   Wt Readings from Last 10 Encounters:   06/18/25 73.9 kg (163 lb)   05/31/25 69.4 kg (153 lb)   05/15/25 69 kg (152 lb 1.9 oz)   05/15/25 69.9 kg (154 lb)   04/21/25 71.2 kg (157 lb)   03/06/25 74.5 kg (164 lb 4.8 oz)   07/29/20 61.7 kg (136 lb 1.6 oz)      Weight Change %:  Weight History / % Weight Change: No significant wt loss noted based on available wt records in EMR.  Significant Weight Loss: No    Nutrition Focused Physical Exam Findings:    Subcutaneous Fat Loss:   Defer Subcutaneous Fat Loss Assessment: Defer all  Defer All Reason: Unavailable/Out of room  Muscle Wasting:  Defer Muscle Wasting Assessment: Defer all  Defer All Reason: Unavailable/Out of room  Edema:  Edema: +2 mild  Edema Location: BLE per " nursing assessment  Physical Findings:  Skin: Negative  Digestive System Findings: Abdominal pain, Diarrhea (per physician notes)  Mouth Findings:  (SMOOTH)    Nutrition Significant Labs:    Reviewed   Nutrition Specific Medications:  Reviewed     I/O:   Last BM Date: 06/17/25; Stool Appearance: Unable to assess (06/18/25 0845)    Dietary Orders (From admission, onward)       Start     Ordered    06/18/25 0001  NPO Diet; Effective midnight  Diet effective midnight         06/17/25 1325    06/17/25 0840  May Participate in Room Service  ( ROOM SERVICE MAY PARTICIPATE)  Once        Question:  .  Answer:  Yes    06/17/25 0839    06/17/25 0639  Oral nutritional supplements  Until discontinued        Question Answer Comment   Deliver with Breakfast    Deliver with Lunch    Deliver with Dinner    Deliver with All meals    Select supplement: Ensure Max        06/17/25 0638                     Estimated Needs:      Method for Estimating Needs: 1415-7644 kcal/d (25-30 kcal/kg IBW)     Method for Estimating 24 Hour Protein Needs: 55 g/d (1 g/kg IBW)     Method for Estimating 24 Hour Fluid Needs: 0110-8711 ml/d (1 ml/kcal or per MD)  Patient on Order Fluid Restriction: No        Nutrition Diagnosis   Malnutrition Diagnosis  Patient has Malnutrition Diagnosis:  (SMOOTH)    Nutrition Diagnosis  Patient has Nutrition Diagnosis: Yes  Diagnosis Status (1): New  Nutrition Diagnosis 1: Inadequate oral intake  Related to (1): decreased appetite, GI symptoms s/p hernia repair  As Evidenced by (1): 25% intake x 1 meal during admission, reports of poor PO intake PTA       Nutrition Interventions/Recommendations   Nutrition prescription for enteral nutrition    Nutrition Recommendations:  Individualized Nutrition Prescription Provided for : Recommend TF Jevity 1.5 bolus feeds of 250 ml 4x/day. 100 ml flushes before and after each bolus, or per MD. If pt is to restart on oral diet, TF can be adjusted to accomodate for additional  energy/nutrient intake.    Nutrition Interventions/Goals:   Enteral Intake: Management of composition of enteral nutrition, Management of delivery rate of enteral nutrition, Management of concentration of enteral nutrition, Management of flushing of feeding tube, Management of volume of enteral nutrition  Goal: Recommend TF Jevity 1.5 bolus feeds of 250 ml 4x/day to provide 1500 kcal, 64 g protein, and 760 ml free H2O. 100 ml flushes before and after each bolus, or per MD.  Coordination of Care with Providers: Nursing, Provider  Goal: Spoke with nurse; secure message to resident      Education Documentation  No documentation found.     N/A - pt unavailable, will assess education needs at follow up.        Nutrition Monitoring and Evaluation   Estimated Energy Intake: Energy intake greater or equal to 75% of estimated energy needs  Enteral and Parenteral Nutrition Intake Determination: Enteral nutrition intake - Tolerate TF at goal rate, Enteral nutrition intake - Prevent refeeding, Enteral nutrition intake - To meet > 75% estimated energy needs    Body Weight: Body weight - Weight reduction from fluids, as needed         Digestive System Finding: Diarrhea, Abdominal pain  Criteria: Improvement in GI function/symptoms    Goal Status: New goal(s) identified    Time Spent (min): 45 minutes        [phone]